# Patient Record
Sex: MALE | Race: WHITE | NOT HISPANIC OR LATINO | Employment: FULL TIME | URBAN - METROPOLITAN AREA
[De-identification: names, ages, dates, MRNs, and addresses within clinical notes are randomized per-mention and may not be internally consistent; named-entity substitution may affect disease eponyms.]

---

## 2017-01-16 ENCOUNTER — GENERIC CONVERSION - ENCOUNTER (OUTPATIENT)
Dept: OTHER | Facility: OTHER | Age: 47
End: 2017-01-16

## 2018-01-15 ENCOUNTER — ALLSCRIPTS OFFICE VISIT (OUTPATIENT)
Dept: OTHER | Facility: OTHER | Age: 48
End: 2018-01-15

## 2018-01-15 ENCOUNTER — GENERIC CONVERSION - ENCOUNTER (OUTPATIENT)
Dept: OTHER | Facility: OTHER | Age: 48
End: 2018-01-15

## 2018-01-15 DIAGNOSIS — R31.29 OTHER MICROSCOPIC HEMATURIA: ICD-10-CM

## 2018-01-15 LAB
CLARITY UR: NORMAL
COLOR UR: YELLOW
GLUCOSE (HISTORICAL): NORMAL
HGB UR QL STRIP.AUTO: NORMAL
KETONES UR STRIP-MCNC: NORMAL MG/DL
LEUKOCYTE ESTERASE UR QL STRIP: NORMAL
NITRITE UR QL STRIP: NORMAL
PH UR STRIP.AUTO: 5 [PH]
PROT UR STRIP-MCNC: NORMAL MG/DL

## 2018-01-23 ENCOUNTER — TRANSCRIBE ORDERS (OUTPATIENT)
Dept: ADMINISTRATIVE | Facility: HOSPITAL | Age: 48
End: 2018-01-23

## 2018-01-23 DIAGNOSIS — R31.29 MICROSCOPIC HEMATURIA: Primary | ICD-10-CM

## 2018-01-23 NOTE — RESULT NOTES
Verified Results  Urine Dip Non-Automated- POC [id number] 09:58AM Miles Punch     Test Name Result Flag Reference   Color Yellow     Clarity Transparent     Leukocytes neg     Nitrite neg     Blood +++     Protein +     Ph 5     Ketone neg     Glucose neg     Color Yellow     Clarity Transparent     Leukocytes neg     Nitrite neg     Blood +++     Protein +     Ph 5     Ketone neg     Glucose neg

## 2019-02-15 ENCOUNTER — OFFICE VISIT (OUTPATIENT)
Dept: UROLOGY | Facility: CLINIC | Age: 49
End: 2019-02-15
Payer: COMMERCIAL

## 2019-02-15 VITALS
HEIGHT: 70 IN | WEIGHT: 179 LBS | BODY MASS INDEX: 25.62 KG/M2 | SYSTOLIC BLOOD PRESSURE: 140 MMHG | DIASTOLIC BLOOD PRESSURE: 80 MMHG | HEART RATE: 80 BPM

## 2019-02-15 DIAGNOSIS — N40.0 BENIGN PROSTATIC HYPERPLASIA WITHOUT LOWER URINARY TRACT SYMPTOMS: Primary | ICD-10-CM

## 2019-02-15 DIAGNOSIS — R31.29 MICROSCOPIC HEMATURIA: ICD-10-CM

## 2019-02-15 LAB
SL AMB  POCT GLUCOSE, UA: ABNORMAL
SL AMB LEUKOCYTE ESTERASE,UA: ABNORMAL
SL AMB POCT BILIRUBIN,UA: ABNORMAL
SL AMB POCT BLOOD,UA: ABNORMAL
SL AMB POCT CLARITY,UA: CLEAR
SL AMB POCT COLOR,UA: YELLOW
SL AMB POCT KETONES,UA: ABNORMAL
SL AMB POCT NITRITE,UA: ABNORMAL
SL AMB POCT PH,UA: 5
SL AMB POCT SPECIFIC GRAVITY,UA: 1.01
SL AMB POCT URINE PROTEIN: ABNORMAL
SL AMB POCT UROBILINOGEN: ABNORMAL

## 2019-02-15 PROCEDURE — 99213 OFFICE O/P EST LOW 20 MIN: CPT | Performed by: PHYSICIAN ASSISTANT

## 2019-02-15 PROCEDURE — 88112 CYTOPATH CELL ENHANCE TECH: CPT | Performed by: PATHOLOGY

## 2019-02-15 PROCEDURE — 81002 URINALYSIS NONAUTO W/O SCOPE: CPT | Performed by: PHYSICIAN ASSISTANT

## 2019-02-15 RX ORDER — DOXAZOSIN MESYLATE 4 MG/1
4 TABLET ORAL
Qty: 90 TABLET | Refills: 3 | Status: SHIPPED | OUTPATIENT
Start: 2019-02-15 | End: 2020-01-21 | Stop reason: SDUPTHER

## 2019-02-15 RX ORDER — DOCUSATE SODIUM 100 MG/1
100 CAPSULE, LIQUID FILLED ORAL DAILY
COMMUNITY
End: 2019-06-04

## 2019-02-15 RX ORDER — DOXAZOSIN 2 MG/1
1 TABLET ORAL DAILY
COMMUNITY
Start: 2018-01-15 | End: 2019-02-15 | Stop reason: SDUPTHER

## 2019-02-15 NOTE — PROGRESS NOTES
UROLOGY PROGRESS NOTE   Patient Identifiers: Vira Lopez (MRN [id number])  Date of Service: 2/15/2019    Subjective:     61-year-old man history of microscopic hematuria  He has a past history of prostatitis he has been asymptomatic for several years  He has been evaluated for microscopic blood greater than 10 years ago in [place]  His urine shows 3+ microscopic blood and he has been resistant for re- evaluation  He refuses imaging or cystoscopy  His last cystoscopy in 2006 was unremarkable  Patient has  no complaints  Objective:     VITALS:    Vitals:    02/15/19 0955   BP: 140/80   Pulse: 80     AUA SYMPTOM SCORE      Most Recent Value   AUA SYMPTOM SCORE   How often have you had a sensation of not emptying your bladder completely after you finished urinating? 0   How often have you had to urinate again less than two hours after you finished urinating? 1   How often have you found you stopped and started again several times when you urinate? 1   How often have you found it difficult to postpone urination? 1   How often have you had a weak urinary stream?  5   How often have you had to push or strain to begin urination? 0   How many times did you most typically get up to urinate from the time you went to bed at night until the time you got up in the morning?   1   Quality of Life: If you were to spend the rest of your life with your urinary condition just the way it is now, how would you feel about that?  4   AUA SYMPTOM SCORE  9            LABS:  No results found for: HGB, HCT, WBC, PLT]    No results found for: NA, K, CL, CO2, BUN, CREATININE, CALCIUM, GLUCOSE]        INPATIENT MEDS:    Current Outpatient Medications:     docusate sodium (COLACE) 100 mg capsule, Take 100 mg by mouth daily, Disp: , Rfl:     Multiple Vitamin (MULTIVITAMINS PO), Take 1 capsule by mouth daily, Disp: , Rfl:     doxazosin (CARDURA) 4 mg tablet, Take 1 tablet (4 mg total) by mouth daily at bedtime, Disp: 90 tablet, Rfl: 3    oxyCODONE (OXY-IR) 5 MG capsule, Take 1 capsule (5 mg total) by mouth every 6 (six) hours as needed for moderate pain  Max Daily Amount: 20 mg (Patient not taking: Reported on 2/15/2019), Disp: 8 capsule, Rfl: 0      Physical Exam:   /80 (BP Location: Right arm, Patient Position: Sitting, Cuff Size: Adult)   Pulse 80   Ht 5' 10" (1 778 m)   Wt 81 2 kg (179 lb)   BMI 25 68 kg/m²   GEN: no acute distress    RESP: breathing comfortably with no accessory muscle use    ABD: soft, non-tender, non-distended   INCISION:    EXT: no significant peripheral edema   (Male): Penis circumcised, phallus normal, meatus patent  Testicles descended into scrotum bilaterally without masses nor tenderness  No inguinal hernias bilaterally  NATHALIE: Prostate is not enlarged at 30 grams  The prostate is not boggy  The prostate is not tender  No nodules noted      RADIOLOGY:      none     Assessment:    1   Microscopic hematuria     Plan:   - recommend imaging and cystoscopy he again declines  - follow-up 1 year for annual exam-  -

## 2019-02-20 ENCOUNTER — TELEPHONE (OUTPATIENT)
Dept: UROLOGY | Facility: AMBULATORY SURGERY CENTER | Age: 49
End: 2019-02-20

## 2019-06-04 ENCOUNTER — OFFICE VISIT (OUTPATIENT)
Dept: FAMILY MEDICINE CLINIC | Facility: CLINIC | Age: 49
End: 2019-06-04
Payer: COMMERCIAL

## 2019-06-04 VITALS
BODY MASS INDEX: 25.17 KG/M2 | DIASTOLIC BLOOD PRESSURE: 90 MMHG | HEART RATE: 63 BPM | RESPIRATION RATE: 16 BRPM | SYSTOLIC BLOOD PRESSURE: 128 MMHG | OXYGEN SATURATION: 97 % | TEMPERATURE: 98.1 F | WEIGHT: 175.8 LBS | HEIGHT: 70 IN

## 2019-06-04 DIAGNOSIS — Z13.6 SCREENING FOR CARDIOVASCULAR CONDITION: ICD-10-CM

## 2019-06-04 DIAGNOSIS — R53.83 FATIGUE, UNSPECIFIED TYPE: ICD-10-CM

## 2019-06-04 DIAGNOSIS — R31.29 HEMATURIA, MICROSCOPIC: ICD-10-CM

## 2019-06-04 DIAGNOSIS — Z00.00 WELL ADULT EXAM: Primary | ICD-10-CM

## 2019-06-04 DIAGNOSIS — N40.0 BPH WITHOUT URINARY OBSTRUCTION: ICD-10-CM

## 2019-06-04 PROCEDURE — 99386 PREV VISIT NEW AGE 40-64: CPT | Performed by: FAMILY MEDICINE

## 2019-06-13 ENCOUNTER — APPOINTMENT (OUTPATIENT)
Dept: LAB | Facility: CLINIC | Age: 49
End: 2019-06-13
Payer: COMMERCIAL

## 2019-06-13 DIAGNOSIS — Z13.6 SCREENING FOR CARDIOVASCULAR CONDITION: ICD-10-CM

## 2019-06-13 DIAGNOSIS — R53.83 FATIGUE, UNSPECIFIED TYPE: ICD-10-CM

## 2019-06-13 LAB
ALBUMIN SERPL BCP-MCNC: 3.9 G/DL (ref 3.5–5)
ALP SERPL-CCNC: 64 U/L (ref 46–116)
ALT SERPL W P-5'-P-CCNC: 19 U/L (ref 12–78)
ANION GAP SERPL CALCULATED.3IONS-SCNC: 4 MMOL/L (ref 4–13)
AST SERPL W P-5'-P-CCNC: 15 U/L (ref 5–45)
BASOPHILS # BLD AUTO: 0.08 THOUSANDS/ΜL (ref 0–0.1)
BASOPHILS NFR BLD AUTO: 2 % (ref 0–1)
BILIRUB SERPL-MCNC: 0.82 MG/DL (ref 0.2–1)
BUN SERPL-MCNC: 13 MG/DL (ref 5–25)
CALCIUM SERPL-MCNC: 8.8 MG/DL (ref 8.3–10.1)
CHLORIDE SERPL-SCNC: 106 MMOL/L (ref 100–108)
CHOLEST SERPL-MCNC: 125 MG/DL (ref 50–200)
CO2 SERPL-SCNC: 32 MMOL/L (ref 21–32)
CREAT SERPL-MCNC: 1.05 MG/DL (ref 0.6–1.3)
EOSINOPHIL # BLD AUTO: 0.2 THOUSAND/ΜL (ref 0–0.61)
EOSINOPHIL NFR BLD AUTO: 4 % (ref 0–6)
ERYTHROCYTE [DISTWIDTH] IN BLOOD BY AUTOMATED COUNT: 12 % (ref 11.6–15.1)
GFR SERPL CREATININE-BSD FRML MDRD: 84 ML/MIN/1.73SQ M
GLUCOSE P FAST SERPL-MCNC: 89 MG/DL (ref 65–99)
HCT VFR BLD AUTO: 46.3 % (ref 36.5–49.3)
HDLC SERPL-MCNC: 34 MG/DL (ref 40–60)
HGB BLD-MCNC: 15.4 G/DL (ref 12–17)
IMM GRANULOCYTES # BLD AUTO: 0.01 THOUSAND/UL (ref 0–0.2)
IMM GRANULOCYTES NFR BLD AUTO: 0 % (ref 0–2)
LDLC SERPL CALC-MCNC: 72 MG/DL (ref 0–100)
LYMPHOCYTES # BLD AUTO: 1.34 THOUSANDS/ΜL (ref 0.6–4.47)
LYMPHOCYTES NFR BLD AUTO: 30 % (ref 14–44)
MCH RBC QN AUTO: 32.6 PG (ref 26.8–34.3)
MCHC RBC AUTO-ENTMCNC: 33.3 G/DL (ref 31.4–37.4)
MCV RBC AUTO: 98 FL (ref 82–98)
MONOCYTES # BLD AUTO: 0.48 THOUSAND/ΜL (ref 0.17–1.22)
MONOCYTES NFR BLD AUTO: 11 % (ref 4–12)
NEUTROPHILS # BLD AUTO: 2.39 THOUSANDS/ΜL (ref 1.85–7.62)
NEUTS SEG NFR BLD AUTO: 53 % (ref 43–75)
NRBC BLD AUTO-RTO: 0 /100 WBCS
PLATELET # BLD AUTO: 160 THOUSANDS/UL (ref 149–390)
PMV BLD AUTO: 8.3 FL (ref 8.9–12.7)
POTASSIUM SERPL-SCNC: 3.6 MMOL/L (ref 3.5–5.3)
PROT SERPL-MCNC: 7.4 G/DL (ref 6.4–8.2)
RBC # BLD AUTO: 4.73 MILLION/UL (ref 3.88–5.62)
SODIUM SERPL-SCNC: 142 MMOL/L (ref 136–145)
TRIGL SERPL-MCNC: 97 MG/DL
TSH SERPL DL<=0.05 MIU/L-ACNC: 2.61 UIU/ML (ref 0.36–3.74)
WBC # BLD AUTO: 4.5 THOUSAND/UL (ref 4.31–10.16)

## 2019-06-13 PROCEDURE — 85025 COMPLETE CBC W/AUTO DIFF WBC: CPT

## 2019-06-13 PROCEDURE — 80061 LIPID PANEL: CPT

## 2019-06-13 PROCEDURE — 84443 ASSAY THYROID STIM HORMONE: CPT

## 2019-06-13 PROCEDURE — 80053 COMPREHEN METABOLIC PANEL: CPT

## 2019-06-13 PROCEDURE — 36415 COLL VENOUS BLD VENIPUNCTURE: CPT

## 2019-09-16 ENCOUNTER — OFFICE VISIT (OUTPATIENT)
Dept: URGENT CARE | Age: 49
End: 2019-09-16
Payer: COMMERCIAL

## 2019-09-16 VITALS
TEMPERATURE: 98 F | DIASTOLIC BLOOD PRESSURE: 86 MMHG | SYSTOLIC BLOOD PRESSURE: 164 MMHG | HEART RATE: 68 BPM | RESPIRATION RATE: 20 BRPM | OXYGEN SATURATION: 99 %

## 2019-09-16 DIAGNOSIS — M54.6 LEFT-SIDED THORACIC BACK PAIN, UNSPECIFIED CHRONICITY: Primary | ICD-10-CM

## 2019-09-16 PROCEDURE — S9083 URGENT CARE CENTER GLOBAL: HCPCS | Performed by: PHYSICIAN ASSISTANT

## 2019-09-16 PROCEDURE — G0382 LEV 3 HOSP TYPE B ED VISIT: HCPCS | Performed by: PHYSICIAN ASSISTANT

## 2019-09-16 RX ORDER — METHOCARBAMOL 500 MG/1
500 TABLET, FILM COATED ORAL 3 TIMES DAILY
Qty: 12 TABLET | Refills: 0 | Status: SHIPPED | OUTPATIENT
Start: 2019-09-16 | End: 2019-09-23

## 2019-09-16 NOTE — PROGRESS NOTES
[facility]        NAME: Sapphire Brink is a 51 y o  male  : 1970    MRN: [id number]  DATE: 2019  TIME: 2:44 PM    Assessment and Plan   Left-sided thoracic back pain, unspecified chronicity [M54 6]  1  Left-sided thoracic back pain, unspecified chronicity  methocarbamol (ROBAXIN) 500 mg tablet         Patient Instructions     Take robaxin as directed  C/w OTC symptom relief, recommend patches, icyhot, & moist heat  Follow up with PCP in 3-5 days  Proceed to  ER if symptoms worsen  Chief Complaint     Chief Complaint   Patient presents with    Neck Pain     x saturday, denies injury , left side of neck between neck and shoulder blade, difficulty moving , with twitching in left arm   History of Present Illness       Patient with hx of chronic back pain presents with complaint of left upper back pain x 2 days  Pt states that he was doing a lot of wood work and states that the pain developed the following day  Pt reports associated twitching in the muscles of his left arm and intermittent paresthesias  Pt reports taking 400 mg of ibuprofen for the past two days  Pt denies other palliative measures  Pt denies fever, chills, night sweats, chest pain, dyspnea, weakness, and radiation of pain  Review of Systems   Review of Systems   Constitutional: Negative for chills, fatigue and fever  HENT: Negative for congestion, ear pain, postnasal drip, rhinorrhea and sore throat  Respiratory: Negative for cough, chest tightness and shortness of breath  Cardiovascular: Negative for chest pain  Gastrointestinal: Negative for abdominal pain, blood in stool, diarrhea, nausea and vomiting  Musculoskeletal: Positive for back pain  Negative for myalgias, neck pain and neck stiffness  Skin: Negative for color change, rash and wound  Neurological: Negative for dizziness, weakness, light-headedness, numbness and headaches     All other systems reviewed and are negative  Current Medications       Current Outpatient Medications:     doxazosin (CARDURA) 4 mg tablet, Take 1 tablet (4 mg total) by mouth daily at bedtime, Disp: 90 tablet, Rfl: 3    methocarbamol (ROBAXIN) 500 mg tablet, Take 1 tablet (500 mg total) by mouth 3 (three) times a day, Disp: 12 tablet, Rfl: 0    Multiple Vitamin (MULTIVITAMINS PO), Take 1 capsule by mouth daily, Disp: , Rfl:     Current Allergies     Allergies as of 09/16/2019    (No Known Allergies)            The following portions of the patient's history were reviewed and updated as appropriate: allergies, current medications, past family history, past medical history, past social history, past surgical history and problem list      Past Medical History:   Diagnosis Date    Hypertension        Past Surgical History:   Procedure Laterality Date    HERNIA REPAIR      TONSILLECTOMY         Family History   Problem Relation Age of Onset    Cancer Mother         Bladder    Hypertension Mother     Skin cancer Father     Arthritis Father     Diabetes Brother     Rheum arthritis Brother     Hypertension Brother          Medications have been verified  Objective   /86   Pulse 68   Temp 98 °F (36 7 °C)   Resp 20   SpO2 99%        Physical Exam     Physical Exam   Constitutional: He is oriented to person, place, and time  He appears well-developed and well-nourished  No distress  HENT:   Head: Normocephalic and atraumatic  Eyes: Pupils are equal, round, and reactive to light  Conjunctivae and EOM are normal    Neck: Normal range of motion  Neck supple  Cardiovascular: Normal rate, regular rhythm and normal heart sounds  Pulmonary/Chest: Effort normal and breath sounds normal  No respiratory distress  Musculoskeletal: He exhibits tenderness     Thoracic spine: no skin changes; tender to deep palpation over left PVMs; sensation intact  Left shoulder: FAROM; UE strength 5/5; sensation intact   Lymphadenopathy: He has no cervical adenopathy  Neurological: He is alert and oriented to person, place, and time  No cranial nerve deficit or sensory deficit  Skin: Skin is warm and dry  Capillary refill takes less than 2 seconds  No rash noted  He is not diaphoretic  Psychiatric: He has a normal mood and affect  His behavior is normal  Thought content normal    Nursing note and vitals reviewed

## 2019-09-23 ENCOUNTER — OFFICE VISIT (OUTPATIENT)
Dept: FAMILY MEDICINE CLINIC | Facility: CLINIC | Age: 49
End: 2019-09-23
Payer: COMMERCIAL

## 2019-09-23 VITALS
HEART RATE: 88 BPM | HEIGHT: 70 IN | RESPIRATION RATE: 18 BRPM | SYSTOLIC BLOOD PRESSURE: 120 MMHG | WEIGHT: 177.8 LBS | OXYGEN SATURATION: 97 % | DIASTOLIC BLOOD PRESSURE: 74 MMHG | BODY MASS INDEX: 25.45 KG/M2 | TEMPERATURE: 97.8 F

## 2019-09-23 DIAGNOSIS — M62.838 CERVICAL PARASPINOUS MUSCLE SPASM: ICD-10-CM

## 2019-09-23 DIAGNOSIS — M54.9 UPPER BACK PAIN ON LEFT SIDE: ICD-10-CM

## 2019-09-23 DIAGNOSIS — M50.90 CERVICAL NECK PAIN WITH EVIDENCE OF DISC DISEASE: Primary | ICD-10-CM

## 2019-09-23 PROCEDURE — 99214 OFFICE O/P EST MOD 30 MIN: CPT | Performed by: FAMILY MEDICINE

## 2019-09-23 RX ORDER — PREDNISONE 10 MG/1
TABLET ORAL
Qty: 20 TABLET | Refills: 0 | Status: SHIPPED | OUTPATIENT
Start: 2019-09-23 | End: 2019-10-01

## 2019-09-23 RX ORDER — GABAPENTIN 300 MG/1
300 CAPSULE ORAL
Qty: 30 CAPSULE | Refills: 1 | Status: SHIPPED | OUTPATIENT
Start: 2019-09-23 | End: 2019-10-04

## 2019-09-23 NOTE — PROGRESS NOTES
FAMILY PRACTICE OFFICE VISIT       NAME: Maria Del Carmen Corado  AGE: 51 y o  SEX: male       : 1970        MRN: [id number]        Assessment and Plan     Problem List Items Addressed This Visit     None      Visit Diagnoses     Cervical neck pain with evidence of disc disease    -  Primary    Relevant Orders    XR spine cervical complete 4 or 5 vw non injury    Cervical paraspinous muscle spasm        Relevant Medications    predniSONE 10 mg tablet    gabapentin (NEURONTIN) 300 mg capsule    Upper back pain on left side        Relevant Orders    XR spine thoracic 3 vw       Patient presents for evaluation of neck pain/upper back pain/thoracic spine pain with left upper extremity radiculopathy  His symptoms have been persistent within past 8-9 days  No improvement with over-the-counter anti-inflammatories as well as muscle relaxant that was prescribed by urgent care center  Will proceed with x-ray of cervical and thoracic spine  Patient will start on prednisone taper at 40 mg daily for 2 days then 30 mg daily for 2 days then 20 mg daily for 2 days then 10 mg daily for 2 days  Will add gabapentin 300 mg q h s     Patient will contact me in a few days if his symptoms are not improving  Will establish follow-up plan of care pending results of x-rays    There are no Patient Instructions on file for this visit  Discussed with the patient and all questioned fully answered  He will call me if any problems arise  M*Modal software was used to dictate this note  It may contain errors with dictating incorrect words/spelling  Please contact provider directly with any questions  Chief Complaint     Chief Complaint   Patient presents with    Shoulder Pain     1 week ago Left shoulder did get a muscle relaxer but it did not help       History of Present Illness     Patient presents for evaluation of neck and upper back pain    Symptoms have started 8-9 days ago after patient has been doing would work/yd work   He woke up next day with upper back pain radiating down to his left shoulder blade, arm and down to his fingers  Patient was evaluated at urgent care center a week ago on Monday  He was prescribed muscle relaxant, medication did not provide any significant relief  Patient has tried anti-inflammatories with minimal improvement  He denies symptoms of chest pain, palpitations, shortness of breath or dizziness  No low back pain, no lower extremity numbness, tingling or paresthesias  Patient admits to decreased strength in his left arm  Pain is significantly worse at night, patient also admits to severe pain upon getting up from the bed  poor sleep within past few days  Pain in the neck is worse with flexion and extension, simple ADLs like brushing his teeth becomes very painful  Shoulder Pain    Pertinent negatives include no fever or numbness  Review of Systems   Review of Systems   Constitutional: Negative  Negative for fever  HENT: Negative  Respiratory: Negative  Cardiovascular: Negative  Gastrointestinal: Negative  Genitourinary: Negative  Musculoskeletal: Positive for myalgias, neck pain and neck stiffness  Neurological: Positive for weakness (Left upper extremity)  Negative for dizziness, numbness and headaches  Psychiatric/Behavioral: Negative          Active Problem List     Patient Active Problem List   Diagnosis    Hematuria, microscopic    BPH without urinary obstruction       Past Medical History:  Past Medical History:   Diagnosis Date    Hypertension        Past Surgical History:  Past Surgical History:   Procedure Laterality Date    HERNIA REPAIR      TONSILLECTOMY         Family History:  Family History   Problem Relation Age of Onset    Cancer Mother         Bladder    Hypertension Mother     Skin cancer Father     Arthritis Father     Diabetes Brother     Rheum arthritis Brother     Hypertension Brother        Social History:  Social History     Socioeconomic History    Marital status: /Civil Union     Spouse name: Not on file    Number of children: Not on file    Years of education: Not on file    Highest education level: Not on file   Occupational History    Not on file   Social Needs    Financial resource strain: Not on file    Food insecurity:     Worry: Not on file     Inability: Not on file    Transportation needs:     Medical: Not on file     Non-medical: Not on file   Tobacco Use    Smoking status: Never Smoker    Smokeless tobacco: Never Used   Substance and Sexual Activity    Alcohol use: Yes     Comment: Social    Drug use: No    Sexual activity: Not on file   Lifestyle    Physical activity:     Days per week: Not on file     Minutes per session: Not on file    Stress: Not on file   Relationships    Social connections:     Talks on phone: Not on file     Gets together: Not on file     Attends Latter day service: Not on file     Active member of club or organization: Not on file     Attends meetings of clubs or organizations: Not on file     Relationship status: Not on file    Intimate partner violence:     Fear of current or ex partner: Not on file     Emotionally abused: Not on file     Physically abused: Not on file     Forced sexual activity: Not on file   Other Topics Concern    Not on file   Social History Narrative    Not on file     PHQ-9 Depression Screening    PHQ-9:    Frequency of the following problems over the past two weeks:                    Objective     Vitals:    09/23/19 1505   BP: 120/74   BP Location: Left arm   Patient Position: Sitting   Cuff Size: Adult   Pulse: 88   Resp: 18   Temp: 97 8 °F (36 6 °C)   TempSrc: Tympanic   SpO2: 97%   Weight: 80 6 kg (177 lb 12 8 oz)   Height: 5' 10" (1 778 m)     Wt Readings from Last 3 Encounters:   09/23/19 80 6 kg (177 lb 12 8 oz)   06/04/19 79 7 kg (175 lb 12 8 oz)   02/15/19 81 2 kg (179 lb)       Physical Exam   Constitutional: He is oriented to person, place, and time  He appears well-developed and well-nourished  HENT:   Head: Normocephalic and atraumatic  Eyes: Conjunctivae are normal    Neck: Neck supple  No JVD present  Carotid bruit is not present  Cardiovascular: Normal rate, regular rhythm and normal heart sounds  No murmur heard  Pulmonary/Chest: Effort normal and breath sounds normal  No respiratory distress  He has no wheezes  He has no rales  Abdominal: Bowel sounds are normal  He exhibits no distension and no abdominal bruit  Musculoskeletal: Normal range of motion  He exhibits no edema  Decreased range of motion of neck, very painful extension and flexion  Painful lateral range of motion,  Cervical paraspinal and trapezius spasm  Thoracic paraspinal spasm  Upper extremity strength 4/5 bilaterally   Neurological: He is alert and oriented to person, place, and time  No cranial nerve deficit  Psychiatric: He has a normal mood and affect  His behavior is normal    Nursing note and vitals reviewed        Pertinent Laboratory/Diagnostic Studies:  Lab Results   Component Value Date    BUN 13 06/13/2019    CREATININE 1 05 06/13/2019    CALCIUM 8 8 06/13/2019    K 3 6 06/13/2019    CO2 32 06/13/2019     06/13/2019     Lab Results   Component Value Date    ALT 19 06/13/2019    AST 15 06/13/2019    ALKPHOS 64 06/13/2019       Lab Results   Component Value Date    WBC 4 50 06/13/2019    HGB 15 4 06/13/2019    HCT 46 3 06/13/2019    MCV 98 06/13/2019     06/13/2019       No results found for: TSH    No results found for: CHOL  Lab Results   Component Value Date    TRIG 97 06/13/2019     Lab Results   Component Value Date    HDL 34 (L) 06/13/2019     Lab Results   Component Value Date    LDLCALC 72 06/13/2019     No results found for: HGBA1C    Results for orders placed or performed in visit on 06/13/19   Comprehensive metabolic panel   Result Value Ref Range    Sodium 142 136 - 145 mmol/L    Potassium 3 6 3 5 - 5 3 mmol/L Chloride 106 100 - 108 mmol/L    CO2 32 21 - 32 mmol/L    ANION GAP 4 4 - 13 mmol/L    BUN 13 5 - 25 mg/dL    Creatinine 1 05 0 60 - 1 30 mg/dL    Glucose, Fasting 89 65 - 99 mg/dL    Calcium 8 8 8 3 - 10 1 mg/dL    AST 15 5 - 45 U/L    ALT 19 12 - 78 U/L    Alkaline Phosphatase 64 46 - 116 U/L    Total Protein 7 4 6 4 - 8 2 g/dL    Albumin 3 9 3 5 - 5 0 g/dL    Total Bilirubin 0 82 0 20 - 1 00 mg/dL    eGFR 84 ml/min/1 73sq m   Lipid Panel with Direct LDL reflex   Result Value Ref Range    Cholesterol 125 50 - 200 mg/dL    Triglycerides 97 <=150 mg/dL    HDL, Direct 34 (L) 40 - 60 mg/dL    LDL Calculated 72 0 - 100 mg/dL   TSH, 3rd generation   Result Value Ref Range    TSH 3RD GENERATON 2 610 0 358 - 3 740 uIU/mL   CBC and differential   Result Value Ref Range    WBC 4 50 4 31 - 10 16 Thousand/uL    RBC 4 73 3 88 - 5 62 Million/uL    Hemoglobin 15 4 12 0 - 17 0 g/dL    Hematocrit 46 3 36 5 - 49 3 %    MCV 98 82 - 98 fL    MCH 32 6 26 8 - 34 3 pg    MCHC 33 3 31 4 - 37 4 g/dL    RDW 12 0 11 6 - 15 1 %    MPV 8 3 (L) 8 9 - 12 7 fL    Platelets 539 874 - 566 Thousands/uL    nRBC 0 /100 WBCs    Neutrophils Relative 53 43 - 75 %    Immat GRANS % 0 0 - 2 %    Lymphocytes Relative 30 14 - 44 %    Monocytes Relative 11 4 - 12 %    Eosinophils Relative 4 0 - 6 %    Basophils Relative 2 (H) 0 - 1 %    Neutrophils Absolute 2 39 1 85 - 7 62 Thousands/µL    Immature Grans Absolute 0 01 0 00 - 0 20 Thousand/uL    Lymphocytes Absolute 1 34 0 60 - 4 47 Thousands/µL    Monocytes Absolute 0 48 0 17 - 1 22 Thousand/µL    Eosinophils Absolute 0 20 0 00 - 0 61 Thousand/µL    Basophils Absolute 0 08 0 00 - 0 10 Thousands/µL       Orders Placed This Encounter   Procedures    XR spine cervical complete 4 or 5 vw non injury    XR spine thoracic 3 vw       ALLERGIES:  No Known Allergies    Current Medications     Current Outpatient Medications   Medication Sig Dispense Refill    doxazosin (CARDURA) 4 mg tablet Take 1 tablet (4 mg total) by mouth daily at bedtime 90 tablet 3    Multiple Vitamin (MULTIVITAMINS PO) Take 1 capsule by mouth daily      gabapentin (NEURONTIN) 300 mg capsule Take 1 capsule (300 mg total) by mouth daily at bedtime 30 capsule 1    predniSONE 10 mg tablet Take 40 mg (4 tabs) daily x 2 days; then 30 mg (3 tabs) daily x 2 days then 20 mg (2 tabs) daily x 2 days then 10 mg ( 1 tab) dailyx 2 days 20 tablet 0     No current facility-administered medications for this visit  Medications Discontinued During This Encounter   Medication Reason    methocarbamol (ROBAXIN) 500 mg tablet        Health Maintenance     Health Maintenance   Topic Date Due    BMI: Followup Plan  09/03/1989    INFLUENZA VACCINE  07/01/2019    DTaP,Tdap,and Td Vaccines (1 - Tdap) 06/03/2020 (Originally 9/3/1992)    Depression Screening PHQ  09/16/2020    BMI: Adult  09/23/2020    Pneumococcal Vaccine: 65+ Years (1 of 2 - PCV13) 09/03/2036    Pneumococcal Vaccine: Pediatrics (0 to 5 Years) and At-Risk Patients (6 to 59 Years)  Aged Out    HEPATITIS B VACCINES  Aged Out         There is no immunization history on file for this patient      Jerald Valdes MD

## 2019-09-24 ENCOUNTER — APPOINTMENT (OUTPATIENT)
Dept: RADIOLOGY | Age: 49
End: 2019-09-24
Payer: COMMERCIAL

## 2019-09-24 DIAGNOSIS — M54.9 UPPER BACK PAIN ON LEFT SIDE: ICD-10-CM

## 2019-09-24 DIAGNOSIS — M54.2 NECK PAIN: ICD-10-CM

## 2019-09-24 PROCEDURE — 72072 X-RAY EXAM THORAC SPINE 3VWS: CPT

## 2019-09-24 PROCEDURE — 72050 X-RAY EXAM NECK SPINE 4/5VWS: CPT

## 2019-09-30 ENCOUNTER — TELEPHONE (OUTPATIENT)
Dept: FAMILY MEDICINE CLINIC | Facility: CLINIC | Age: 49
End: 2019-09-30

## 2019-09-30 NOTE — TELEPHONE ENCOUNTER
Spoke with patient and gave him results  He is still having the same pain  He stated that the prednisone and the gabapentin dont seem to be working at all  Please call to advise on what the next step is

## 2019-09-30 NOTE — TELEPHONE ENCOUNTER
Please contact patient  I received results of x-rays  Neck x-ray was normal   X-ray of thoracic spine revealed mild arthritic changes  If upper back pain is not improving or persisting-please let me know    Thank you

## 2019-09-30 NOTE — TELEPHONE ENCOUNTER
Patient has tried anti-inflammatories, muscle relaxants, prednisone and gabapentin  If symptoms are not improving,I would like to re-examine him prior to making further suggestions  I could see him tomorrow at 5:15 pm , okay to make my 5 o'clock  appointment 15 minute office visit    Thank you

## 2019-10-01 ENCOUNTER — OFFICE VISIT (OUTPATIENT)
Dept: FAMILY MEDICINE CLINIC | Facility: CLINIC | Age: 49
End: 2019-10-01
Payer: COMMERCIAL

## 2019-10-01 VITALS
OXYGEN SATURATION: 98 % | RESPIRATION RATE: 16 BRPM | BODY MASS INDEX: 25.28 KG/M2 | WEIGHT: 176.6 LBS | TEMPERATURE: 97.6 F | DIASTOLIC BLOOD PRESSURE: 90 MMHG | HEIGHT: 70 IN | SYSTOLIC BLOOD PRESSURE: 130 MMHG | HEART RATE: 74 BPM

## 2019-10-01 DIAGNOSIS — M54.9 UPPER BACK PAIN ON LEFT SIDE: ICD-10-CM

## 2019-10-01 DIAGNOSIS — M79.622 LEFT UPPER ARM PAIN: ICD-10-CM

## 2019-10-01 DIAGNOSIS — M54.9 MID BACK PAIN ON LEFT SIDE: Primary | ICD-10-CM

## 2019-10-01 PROCEDURE — 3008F BODY MASS INDEX DOCD: CPT | Performed by: FAMILY MEDICINE

## 2019-10-01 PROCEDURE — 99213 OFFICE O/P EST LOW 20 MIN: CPT | Performed by: FAMILY MEDICINE

## 2019-10-01 RX ORDER — CELECOXIB 200 MG/1
200 CAPSULE ORAL 2 TIMES DAILY
Qty: 30 CAPSULE | Refills: 1 | Status: SHIPPED | OUTPATIENT
Start: 2019-10-01 | End: 2019-10-09 | Stop reason: SDUPTHER

## 2019-10-01 RX ORDER — CYCLOBENZAPRINE HCL 10 MG
10 TABLET ORAL 3 TIMES DAILY PRN
Qty: 30 TABLET | Refills: 1 | Status: SHIPPED | OUTPATIENT
Start: 2019-10-01 | End: 2021-06-24

## 2019-10-01 NOTE — PROGRESS NOTES
FAMILY PRACTICE OFFICE VISIT       NAME: Onesimo Corado  AGE: 51 y o  SEX: male       : 1970        MRN: [id number]        Assessment and Plan     Problem List Items Addressed This Visit        Other    Left upper arm pain    Relevant Orders    EMG 2 Limb Upper Extremity (Completed)      Other Visit Diagnoses     Mid back pain on left side    -  Primary    Relevant Medications    celecoxib (CeleBREX) 200 mg capsule    cyclobenzaprine (FLEXERIL) 10 mg tablet    Other Relevant Orders    XR chest pa & lateral    XR acromioclavicular joints bilateral w wo weights    Upper back pain on left side        Relevant Orders    XR chest pa & lateral    XR shoulder 2+ vw left    XR acromioclavicular joints bilateral w wo weights       Patient presents for evaluation of persistent upper back/left shoulder blade pain along with left upper extremity achiness and paresthesias  Previous workup included fairly unremarkable cervical and thoracic spine x-rays  Patient unfortunately did not respond to treatment regimen of  prednisone taper and gabapentin  His symptoms are relieved by stretching  No chest pain, palpitations, dyspnea, no exertional symptoms  ·  I suspect significant component of muscle spasm  · Will discontinue gabapentin  · Will start patient on regimen of Celebrex in the morning and Flexeril at night  · He will proceed with additional imaging studies including chest x-ray, left shoulder x-ray and x-ray of AC joint  · Will request EMG/nerve conduction study of upper extremity due to persistent symptoms of upper extremity radiculopathy  · Patient remains under chiropractic care      There are no Patient Instructions on file for this visit  Discussed with the patient and all questioned fully answered  He will call me if any problems arise  M*Modal software was used to dictate this note  It may contain errors with dictating incorrect words/spelling   Please contact provider directly with any questions  Chief Complaint     Chief Complaint   Patient presents with    Back Pain     Pt is here for left sided upper back pain down left arm 2 + wks       History of Present Illness       Persistent upper back, left shoulder blade pain  Patient is here today accompanied by his wife  He was seen in the office a week ago for similar symptoms  We have treated him with 8 day prednisone taper and started gabapentin, patient unfortunately has noticed no improvement in his symptoms  X-ray of cervical spine was unremarkable, x-ray of thoracic spine revealed mild arthritic changes  Persistent pain down to left  arm - entire arm feels achy with constant numbness of 2nd digit     Entire left hand feels "numbish"    Pain is worse with brushing teeth  Pain in left arm with steering wheel while driving, some subjective weakness in the left arm with exercising or pushups  Symptoms are nonexertional, patient denies symptoms of chest pain, palpitations, shortness of breath or dizziness  No diaphoresis  Patient feels worse upon waking up in the morning and getting up from bed        Back Pain   Associated symptoms include numbness  Pertinent negatives include no chest pain or fever  Review of Systems   Review of Systems   Constitutional: Negative  Negative for activity change, appetite change and fever  HENT: Negative  Eyes: Negative  Respiratory: Negative  Negative for cough, chest tightness and shortness of breath  Cardiovascular: Negative  Negative for chest pain, palpitations and leg swelling  Gastrointestinal: Negative  Genitourinary: Negative  Musculoskeletal: Positive for back pain  Neurological: Positive for numbness  Psychiatric/Behavioral: Negative          Active Problem List     Patient Active Problem List   Diagnosis    Hematuria, microscopic    BPH without urinary obstruction    Left upper arm pain    Carpal tunnel syndrome, bilateral       Past Medical History:  Past Medical History:   Diagnosis Date    Hypertension        Past Surgical History:  Past Surgical History:   Procedure Laterality Date    HERNIA REPAIR      TONSILLECTOMY         Family History:  Family History   Problem Relation Age of Onset    Cancer Mother         Bladder    Hypertension Mother     Skin cancer Father     Arthritis Father     Diabetes Brother     Rheum arthritis Brother     Hypertension Brother        Social History:  Social History     Socioeconomic History    Marital status: /Civil Union     Spouse name: Not on file    Number of children: Not on file    Years of education: Not on file    Highest education level: Not on file   Occupational History    Not on file   Social Needs    Financial resource strain: Not on file    Food insecurity:     Worry: Not on file     Inability: Not on file    Transportation needs:     Medical: Not on file     Non-medical: Not on file   Tobacco Use    Smoking status: Never Smoker    Smokeless tobacco: Never Used   Substance and Sexual Activity    Alcohol use: Yes     Comment: Social    Drug use: No    Sexual activity: Not on file   Lifestyle    Physical activity:     Days per week: Not on file     Minutes per session: Not on file    Stress: Not on file   Relationships    Social connections:     Talks on phone: Not on file     Gets together: Not on file     Attends [employer] service: Not on file     Active member of club or organization: Not on file     Attends meetings of clubs or organizations: Not on file     Relationship status: Not on file    Intimate partner violence:     Fear of current or ex partner: Not on file     Emotionally abused: Not on file     Physically abused: Not on file     Forced sexual activity: Not on file   Other Topics Concern    Not on file   Social History Narrative    Not on file       Objective     Vitals:    10/01/19 1718   BP: 130/90   Pulse: 74   Resp: 16   Temp: 97 6 °F (36 4 °C) TempSrc: Tympanic   SpO2: 98%   Weight: 80 1 kg (176 lb 9 6 oz)   Height: 5' 10" (1 778 m)     Wt Readings from Last 3 Encounters:   10/01/19 80 1 kg (176 lb 9 6 oz)   09/23/19 80 6 kg (177 lb 12 8 oz)   06/04/19 79 7 kg (175 lb 12 8 oz)       Physical Exam   Constitutional: He is oriented to person, place, and time  He appears well-developed and well-nourished  HENT:   Head: Normocephalic and atraumatic  Eyes: Conjunctivae are normal    Neck: Neck supple  No JVD present  Carotid bruit is not present  Cardiovascular: Normal rate, regular rhythm and normal heart sounds  No murmur heard  Pulmonary/Chest: Effort normal and breath sounds normal  No respiratory distress  He has no wheezes  He has no rales  Abdominal: He exhibits no distension and no abdominal bruit  Musculoskeletal: Normal range of motion  He exhibits no edema  Left  Shoulder : full ROM-  No  Restriction, no pain  Tenderness with palpation of lower cervical and upper thoracic spine from C6 to T4-T5  Paraspinal spasm, left trapezius spasm  Upper extremity strength 4 /5 all groups, even bilaterally     Neurological: He is alert and oriented to person, place, and time  No cranial nerve deficit  Psychiatric: He has a normal mood and affect  His behavior is normal    Nursing note and vitals reviewed        Pertinent Laboratory/Diagnostic Studies:  Lab Results   Component Value Date    BUN 13 06/13/2019    CREATININE 1 05 06/13/2019    CALCIUM 8 8 06/13/2019    K 3 6 06/13/2019    CO2 32 06/13/2019     06/13/2019     Lab Results   Component Value Date    ALT 19 06/13/2019    AST 15 06/13/2019    ALKPHOS 64 06/13/2019       Lab Results   Component Value Date    WBC 4 50 06/13/2019    HGB 15 4 06/13/2019    HCT 46 3 06/13/2019    MCV 98 06/13/2019     06/13/2019       No results found for: TSH    No results found for: CHOL  Lab Results   Component Value Date    TRIG 97 06/13/2019     Lab Results   Component Value Date    HDL 34 (L) 06/13/2019     Lab Results   Component Value Date    LDLCALC 72 06/13/2019     No results found for: HGBA1C    Results for orders placed or performed in visit on 06/13/19   Comprehensive metabolic panel   Result Value Ref Range    Sodium 142 136 - 145 mmol/L    Potassium 3 6 3 5 - 5 3 mmol/L    Chloride 106 100 - 108 mmol/L    CO2 32 21 - 32 mmol/L    ANION GAP 4 4 - 13 mmol/L    BUN 13 5 - 25 mg/dL    Creatinine 1 05 0 60 - 1 30 mg/dL    Glucose, Fasting 89 65 - 99 mg/dL    Calcium 8 8 8 3 - 10 1 mg/dL    AST 15 5 - 45 U/L    ALT 19 12 - 78 U/L    Alkaline Phosphatase 64 46 - 116 U/L    Total Protein 7 4 6 4 - 8 2 g/dL    Albumin 3 9 3 5 - 5 0 g/dL    Total Bilirubin 0 82 0 20 - 1 00 mg/dL    eGFR 84 ml/min/1 73sq m   Lipid Panel with Direct LDL reflex   Result Value Ref Range    Cholesterol 125 50 - 200 mg/dL    Triglycerides 97 <=150 mg/dL    HDL, Direct 34 (L) 40 - 60 mg/dL    LDL Calculated 72 0 - 100 mg/dL   TSH, 3rd generation   Result Value Ref Range    TSH 3RD GENERATON 2 610 0 358 - 3 740 uIU/mL   CBC and differential   Result Value Ref Range    WBC 4 50 4 31 - 10 16 Thousand/uL    RBC 4 73 3 88 - 5 62 Million/uL    Hemoglobin 15 4 12 0 - 17 0 g/dL    Hematocrit 46 3 36 5 - 49 3 %    MCV 98 82 - 98 fL    MCH 32 6 26 8 - 34 3 pg    MCHC 33 3 31 4 - 37 4 g/dL    RDW 12 0 11 6 - 15 1 %    MPV 8 3 (L) 8 9 - 12 7 fL    Platelets 010 869 - 247 Thousands/uL    nRBC 0 /100 WBCs    Neutrophils Relative 53 43 - 75 %    Immat GRANS % 0 0 - 2 %    Lymphocytes Relative 30 14 - 44 %    Monocytes Relative 11 4 - 12 %    Eosinophils Relative 4 0 - 6 %    Basophils Relative 2 (H) 0 - 1 %    Neutrophils Absolute 2 39 1 85 - 7 62 Thousands/µL    Immature Grans Absolute 0 01 0 00 - 0 20 Thousand/uL    Lymphocytes Absolute 1 34 0 60 - 4 47 Thousands/µL    Monocytes Absolute 0 48 0 17 - 1 22 Thousand/µL    Eosinophils Absolute 0 20 0 00 - 0 61 Thousand/µL    Basophils Absolute 0 08 0 00 - 0 10 Thousands/µL       Orders Placed This Encounter   Procedures    XR chest pa & lateral    XR shoulder 2+ vw left    XR acromioclavicular joints bilateral w wo weights    EMG 2 Limb Upper Extremity       ALLERGIES:  No Known Allergies    Current Medications     Current Outpatient Medications   Medication Sig Dispense Refill    doxazosin (CARDURA) 4 mg tablet Take 1 tablet (4 mg total) by mouth daily at bedtime 90 tablet 3    Multiple Vitamin (MULTIVITAMINS PO) Take 1 capsule by mouth daily      celecoxib (CeleBREX) 200 mg capsule Take 1 capsule (200 mg total) by mouth 2 (two) times a day 30 capsule 1    cyclobenzaprine (FLEXERIL) 10 mg tablet Take 1 tablet (10 mg total) by mouth 3 (three) times a day as needed for muscle spasms 30 tablet 1     No current facility-administered medications for this visit  Medications Discontinued During This Encounter   Medication Reason    predniSONE 10 mg tablet     gabapentin (NEURONTIN) 300 mg capsule        Health Maintenance     Health Maintenance   Topic Date Due    BMI: Followup Plan  09/03/1989    DTaP,Tdap,and Td Vaccines (1 - Tdap) 06/03/2020 (Originally 9/3/1992)    INFLUENZA VACCINE  10/01/2020 (Originally 7/1/2019)    Depression Screening PHQ  09/16/2020    BMI: Adult  10/01/2020    Pneumococcal Vaccine: 65+ Years (1 of 2 - PCV13) 09/03/2036    Pneumococcal Vaccine: Pediatrics (0 to 5 Years) and At-Risk Patients (6 to 59 Years)  Aged Out    HEPATITIS B VACCINES  Aged Out         There is no immunization history on file for this patient      Gaudencio Ureña MD

## 2019-10-03 ENCOUNTER — HOSPITAL ENCOUNTER (OUTPATIENT)
Dept: NEUROLOGY | Facility: CLINIC | Age: 49
Discharge: HOME | End: 2019-10-03
Payer: COMMERCIAL

## 2019-10-03 ENCOUNTER — TRANSCRIBE ORDERS (OUTPATIENT)
Dept: RADIOLOGY | Facility: HOSPITAL | Age: 49
End: 2019-10-03

## 2019-10-03 ENCOUNTER — HOSPITAL ENCOUNTER (OUTPATIENT)
Dept: RADIOLOGY | Facility: HOSPITAL | Age: 49
Discharge: HOME | End: 2019-10-03
Payer: COMMERCIAL

## 2019-10-03 DIAGNOSIS — M54.9 MID BACK PAIN ON LEFT SIDE: ICD-10-CM

## 2019-10-03 DIAGNOSIS — M79.622 LEFT UPPER ARM PAIN: ICD-10-CM

## 2019-10-03 DIAGNOSIS — M54.9 UPPER BACK PAIN ON LEFT SIDE: ICD-10-CM

## 2019-10-03 PROCEDURE — 95886 MUSC TEST DONE W/N TEST COMP: CPT | Performed by: PSYCHIATRY & NEUROLOGY

## 2019-10-03 PROCEDURE — 71046 X-RAY EXAM CHEST 2 VIEWS: CPT

## 2019-10-03 PROCEDURE — 73050 X-RAY EXAM OF SHOULDERS: CPT

## 2019-10-03 PROCEDURE — 95911 NRV CNDJ TEST 9-10 STUDIES: CPT | Performed by: PSYCHIATRY & NEUROLOGY

## 2019-10-03 PROCEDURE — 73030 X-RAY EXAM OF SHOULDER: CPT

## 2019-10-07 ENCOUNTER — TELEPHONE (OUTPATIENT)
Dept: FAMILY MEDICINE CLINIC | Facility: CLINIC | Age: 49
End: 2019-10-07

## 2019-10-07 DIAGNOSIS — G54.0 THORACIC OUTLET SYNDROME: Primary | ICD-10-CM

## 2019-10-07 DIAGNOSIS — M79.622 LEFT UPPER ARM PAIN: ICD-10-CM

## 2019-10-07 NOTE — TELEPHONE ENCOUNTER
I reviewed patient's diagnostic results  Normal chest x-ray  Normal x-ray of left shoulder  Normal x-ray of AC joint  EMG/nerve conduction study of upper extremities:  Evidence of bilateral mild carpal tunnel syndrome, no evidence of cervical radiculopathy  Neurologist states that this study could be insensitive in assessing cervical radiculopathy due to recent onset of symptoms and recommends repeating it in 3-4 months if clinically indicated  I suspect that patient might be experiencing symptoms of thoracic outlet syndrome  Patient states that his symptoms overall have stabilized, possibly slightly better  He is feeling worse at nighttime and 1st thing in the morning  Stiffness in his left shoulder blade and left arm pain worsens at night and in the morning  Patient remains on regimen of Celebrex 200 mg b i d  And Flexeril 10 mg q h s   I advised him to modify this regimen and change Celebrex to 200 mg daily and use 5 mg of Flexeril twice a day and 10 mg at night  Patient remains under care of chiropractor  Will forward records as requested  I am referring patient to Physical Medicine and Rehabilitation for further evaluation  Patient understands instructions and agrees

## 2019-10-09 ENCOUNTER — TELEPHONE (OUTPATIENT)
Dept: FAMILY MEDICINE CLINIC | Facility: CLINIC | Age: 49
End: 2019-10-09

## 2019-10-09 DIAGNOSIS — M54.9 MID BACK PAIN ON LEFT SIDE: ICD-10-CM

## 2019-10-09 RX ORDER — CELECOXIB 200 MG/1
CAPSULE ORAL
Qty: 60 CAPSULE | Refills: 1 | Status: SHIPPED | OUTPATIENT
Start: 2019-10-09 | End: 2021-06-24

## 2019-10-09 NOTE — TELEPHONE ENCOUNTER
Spoke with pt, I also gave him the phone number for Dr Keke Hall  He did not take the antiinflammatory last night as you had said to drop to once a day and had a lot of pain  Took med this am with effect

## 2019-10-09 NOTE — TELEPHONE ENCOUNTER
Please contact patient  He can go back on Celebrex twice a day and continue Flexeril/muscle relaxant at half a tablet twice a day and 1 tablet at night  I will send updated prescription for Celebrex to the pharmacy so he would not run out  Please let him know that I will try to reach Dr Philip and facilitate earlier appointment for him

## 2019-10-09 NOTE — TELEPHONE ENCOUNTER
Wants Dr Bozena Cooper to know that [facility]  & he feels that is too long of a wait  Also states she told him to take the medication once a day & today he is in more pain & it's really bad  Wants to know if it's ok to go back to twice a day again  Please call to advise

## 2019-10-17 ENCOUNTER — TELEPHONE (OUTPATIENT)
Dept: FAMILY MEDICINE CLINIC | Facility: CLINIC | Age: 49
End: 2019-10-17

## 2019-10-17 ENCOUNTER — TELEPHONE (OUTPATIENT)
Dept: NEUROLOGY | Facility: CLINIC | Age: 49
End: 2019-10-17

## 2019-10-17 DIAGNOSIS — M54.9 UPPER BACK PAIN ON LEFT SIDE: ICD-10-CM

## 2019-10-17 DIAGNOSIS — G54.0 THORACIC OUTLET SYNDROME: Primary | ICD-10-CM

## 2019-10-17 NOTE — TELEPHONE ENCOUNTER
I was in touch with [facility], Dr Gil with request to move patient's appointment to earlier date  I was promised that they will try to facilitate earlier appointment but according to the schedule, patient is to see Dr Haile only on 12/4/19  He was also referred to is [place] [facility],Dr Bach  Nurses:  Please call pt Re : above  Please  ask patient to try office of [facility] instead  Hopefully that can accommodate him within next few weeks  I did plce new [facility] referral  Please ask patient to call us back if he is not able to schedule appointment in a timely manner within next few weeks  Please let patient know that I did place orders for MRI of cervical and thoracic spine      Thank you

## 2019-10-17 NOTE — TELEPHONE ENCOUNTER
Patient states Dr Derek Hutchinson was to contact the Dr she referred him to to see if he could get an earlier appt  He is just following up & also states it's around time for him to get an MRI again, wants to know if he can get that also  Please call to advise

## 2019-10-22 ENCOUNTER — TELEPHONE (OUTPATIENT)
Dept: NEUROLOGY | Facility: CLINIC | Age: 49
End: 2019-10-22

## 2019-10-22 NOTE — TELEPHONE ENCOUNTER
Called and left message stating Dr Aline Multani has some sooner appointments, if interested in moving appointment up to please call back

## 2019-10-23 ENCOUNTER — CONSULT (OUTPATIENT)
Dept: NEUROLOGY | Facility: CLINIC | Age: 49
End: 2019-10-23
Payer: COMMERCIAL

## 2019-10-23 VITALS
HEIGHT: 70 IN | WEIGHT: 180 LBS | HEART RATE: 87 BPM | BODY MASS INDEX: 25.77 KG/M2 | SYSTOLIC BLOOD PRESSURE: 144 MMHG | DIASTOLIC BLOOD PRESSURE: 91 MMHG

## 2019-10-23 DIAGNOSIS — G54.0 THORACIC OUTLET SYNDROME: ICD-10-CM

## 2019-10-23 DIAGNOSIS — M54.12 CERVICAL RADICULOPATHY: Primary | ICD-10-CM

## 2019-10-23 DIAGNOSIS — M79.622 LEFT UPPER ARM PAIN: ICD-10-CM

## 2019-10-23 DIAGNOSIS — G56.03 CARPAL TUNNEL SYNDROME, BILATERAL: ICD-10-CM

## 2019-10-23 PROCEDURE — 99244 OFF/OP CNSLTJ NEW/EST MOD 40: CPT | Performed by: PHYSICAL MEDICINE & REHABILITATION
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
## 2019-10-25 ENCOUNTER — TELEPHONE (OUTPATIENT)
Dept: FAMILY MEDICINE CLINIC | Facility: CLINIC | Age: 49
End: 2019-10-25

## 2019-10-29 ENCOUNTER — EVALUATION (OUTPATIENT)
Dept: PHYSICAL THERAPY | Facility: REHABILITATION | Age: 49
End: 2019-10-29
Payer: COMMERCIAL

## 2019-10-29 DIAGNOSIS — M54.12 CERVICAL RADICULOPATHY: Primary | ICD-10-CM

## 2019-10-29 PROCEDURE — 97162 PT EVAL MOD COMPLEX 30 MIN: CPT | Performed by: PHYSICAL THERAPIST

## 2019-10-29 PROCEDURE — 97112 NEUROMUSCULAR REEDUCATION: CPT | Performed by: PHYSICAL THERAPIST
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
## 2019-11-06 ENCOUNTER — OFFICE VISIT (OUTPATIENT)
Dept: PHYSICAL THERAPY | Facility: REHABILITATION | Age: 49
End: 2019-11-06
Payer: COMMERCIAL

## 2019-11-06 DIAGNOSIS — M54.12 CERVICAL RADICULOPATHY: Primary | ICD-10-CM

## 2019-11-06 PROCEDURE — 97110 THERAPEUTIC EXERCISES: CPT

## 2019-11-06 PROCEDURE — 97112 NEUROMUSCULAR REEDUCATION: CPT

## 2019-11-06 PROCEDURE — 97140 MANUAL THERAPY 1/> REGIONS: CPT

## 2019-11-06 NOTE — PROGRESS NOTES
Daily Note     Today's date: 2019  Patient name: Lelia Ruvalcaba  : 1970  MRN: [id number]  Referring provider: Tyler Gutierrez MD  Dx:   Encounter Diagnosis     ICD-10-CM    1  Cervical radiculopathy M54 12        Start Time:   Stop Time:   Total time in clinic (min): 45 minutes    Subjective: Pt reports continued numbness and "pins and needles" that radiate down LUE intermittently  Denies any radicular symptoms prior to start of session  Notices continued weakness in L triceps when lifting objects overhead  Objective: See treatment diary below      Assessment: Tolerated treatment well  Was able to perform all exercise with no significant increase in symptoms  Slight tingling in R index finger reported during radial nerve glides that resolve with rest after completing this interventions  Challenged with [place] movements, but able to complete with no pain or radicular symptoms  Patient would benefit from continued PT to further improve strength and reduce frequency of symptoms  Plan: Continue per plan of care  Monitor response to initial treatment NV  Precautions: HTN      Manual  10/29 11/6           Cervical side glides  KK           Gr  II-IV cervical central PA mobs  KK           Left UT TPR  AFB                                         Exercise Diary  10/29 11/6           UBE  2'/2'           Radial, median nerve glides 2x20 ea  2x20  ea           UT str    20"x4           Wall slides with scapular depression             Shoulder extension             Supine DNF  5"x20           Blackburns: ext, h abd, scap  5"x20 ea                                                                                                                                                                                        Modalities  10/29            Saunder's mechanical traction

## 2019-11-13 ENCOUNTER — OFFICE VISIT (OUTPATIENT)
Dept: PHYSICAL THERAPY | Facility: REHABILITATION | Age: 49
End: 2019-11-13
Payer: COMMERCIAL

## 2019-11-13 DIAGNOSIS — M54.12 CERVICAL RADICULOPATHY: Primary | ICD-10-CM

## 2019-11-13 PROCEDURE — 97110 THERAPEUTIC EXERCISES: CPT | Performed by: PHYSICAL THERAPIST

## 2019-11-13 PROCEDURE — 97012 MECHANICAL TRACTION THERAPY: CPT | Performed by: PHYSICAL THERAPIST

## 2019-11-13 PROCEDURE — 97140 MANUAL THERAPY 1/> REGIONS: CPT | Performed by: PHYSICAL THERAPIST

## 2019-11-13 PROCEDURE — 97112 NEUROMUSCULAR REEDUCATION: CPT | Performed by: PHYSICAL THERAPIST

## 2019-11-13 NOTE — PROGRESS NOTES
Daily Note     Today's date: 2019  Patient name: J Luis Lim  : 1970  MRN: [id number]  Referring provider: Luma Calderón MD  Dx:   Encounter Diagnosis     ICD-10-CM    1  Cervical radiculopathy M54 12        Start Time:   Stop Time:   Total time in clinic (min): 70 minutes    Subjective: Patient reports that much of his pain has resolved and his primary limitation at this time is weakness, which he mostly notices while working out  Patient has a mild sensation of swelling or numbness over his index finger  Objective: See treatment diary below      Assessment: Tolerated treatment well  Patient demonstrated fatigue post treatment, exhibited good technique with therapeutic exercises and would benefit from continued PT  Patient continues to present with mild radicular symptoms, including weakness and numbness of left index finger  No exacerbation of symptoms this visit  Plan: Continue per plan of care  Progress treatment as tolerated  Precautions: HTN      Manual  10/29 11/6 11/13          Cervical side glides  KK GR          Gr  II-IV cervical central PA mobs  KK GR          Left UT TPR  AFB GR                                        Exercise Diary  10/29 11/6 11/13          UBE  2'/2' 2' / 2'          Radial, median nerve glides 2x20 ea  2x20  ea           UT str    20"x4           Wall slides with scapular depression   20x          Shoulder extension with walkout maintaining upper cervical retraction   20x5" XS Purple          Supine DNF  5"x20           Blackburns: ext, h abd, scap  5"x20 ea Reviewed          Horizontal abduction with push-out   2x10 3" GTB          No money with upper cervical retraction   2x10 5"                                                                                                                                                             Modalities  10/29 11/6 11/13          Saunder's mechanical traction   2x5'

## 2019-11-20 ENCOUNTER — APPOINTMENT (OUTPATIENT)
Dept: PHYSICAL THERAPY | Facility: REHABILITATION | Age: 49
End: 2019-11-20
Payer: COMMERCIAL

## 2019-11-25 NOTE — PROGRESS NOTES
Robert Whiteheadian has attended a total of 3 physical therapy appointments to date  Patient was last treated on 11/13/19 and has cancelled all remaining appointments scheduled  Goals, objective and subjective information unable to be updated at this time   Patient will be discharged from physical therapy per request

## 2019-11-27 ENCOUNTER — APPOINTMENT (OUTPATIENT)
Dept: PHYSICAL THERAPY | Facility: REHABILITATION | Age: 49
End: 2019-11-27
Payer: COMMERCIAL

## 2020-01-06 ENCOUNTER — TELEPHONE (OUTPATIENT)
Dept: NEUROLOGY | Facility: CLINIC | Age: 50
End: 2020-01-06

## 2020-01-06 NOTE — TELEPHONE ENCOUNTER
Called and [facility] letting patient know we are calling to reschedule his appointment  We have availability 3/25/2020 at 2:00 pm if patient would like to come in, Let patinet know to call us back to confirm       (30 min F/U w/ Dr Rufina Martinez)

## 2020-01-06 NOTE — TELEPHONE ENCOUNTER
----- Message from Jerald Wade MD sent at 10/9/2019  2:09 PM EDT -----  Regarding: new patient pacheco Messina Edenikolas Egan,    Can you call this patient and see if Dr Khari Lo has an earlier 60 min time slot to be seen for arm pain/numbness/thoracic outlet syndrome type symptoms        Let me know when the appointment is made

## 2020-01-21 DIAGNOSIS — N40.0 BENIGN PROSTATIC HYPERPLASIA WITHOUT LOWER URINARY TRACT SYMPTOMS: ICD-10-CM

## 2020-01-21 RX ORDER — DOXAZOSIN MESYLATE 4 MG/1
4 TABLET ORAL
Qty: 90 TABLET | Refills: 3 | Status: SHIPPED | OUTPATIENT
Start: 2020-01-21 | End: 2020-11-20 | Stop reason: SDUPTHER

## 2020-06-23 ENCOUNTER — OFFICE VISIT (OUTPATIENT)
Dept: FAMILY MEDICINE CLINIC | Facility: CLINIC | Age: 50
End: 2020-06-23
Payer: COMMERCIAL

## 2020-06-23 VITALS
WEIGHT: 179.8 LBS | HEART RATE: 74 BPM | HEIGHT: 70 IN | BODY MASS INDEX: 25.74 KG/M2 | TEMPERATURE: 98 F | OXYGEN SATURATION: 98 % | RESPIRATION RATE: 16 BRPM | SYSTOLIC BLOOD PRESSURE: 140 MMHG | DIASTOLIC BLOOD PRESSURE: 82 MMHG

## 2020-06-23 DIAGNOSIS — M79.644 PAIN IN FINGER OF RIGHT HAND: ICD-10-CM

## 2020-06-23 DIAGNOSIS — K21.9 GASTROESOPHAGEAL REFLUX DISEASE WITHOUT ESOPHAGITIS: ICD-10-CM

## 2020-06-23 DIAGNOSIS — R12 HEARTBURN: ICD-10-CM

## 2020-06-23 DIAGNOSIS — N40.0 BPH WITHOUT URINARY OBSTRUCTION: ICD-10-CM

## 2020-06-23 DIAGNOSIS — M25.441 SWELLING OF FINGER JOINT OF RIGHT HAND: ICD-10-CM

## 2020-06-23 DIAGNOSIS — Z00.00 WELL ADULT EXAM: Primary | ICD-10-CM

## 2020-06-23 DIAGNOSIS — Z13.220 NEED FOR LIPID SCREENING: ICD-10-CM

## 2020-06-23 PROCEDURE — 99396 PREV VISIT EST AGE 40-64: CPT | Performed by: FAMILY MEDICINE

## 2020-06-23 PROCEDURE — 93000 ELECTROCARDIOGRAM COMPLETE: CPT | Performed by: FAMILY MEDICINE

## 2020-06-23 PROCEDURE — 3008F BODY MASS INDEX DOCD: CPT | Performed by: FAMILY MEDICINE

## 2020-06-24 ENCOUNTER — APPOINTMENT (OUTPATIENT)
Dept: LAB | Facility: CLINIC | Age: 50
End: 2020-06-24
Payer: COMMERCIAL

## 2020-06-24 DIAGNOSIS — Z13.220 NEED FOR LIPID SCREENING: ICD-10-CM

## 2020-06-24 DIAGNOSIS — K21.9 GASTROESOPHAGEAL REFLUX DISEASE WITHOUT ESOPHAGITIS: ICD-10-CM

## 2020-06-24 DIAGNOSIS — M79.644 PAIN IN FINGER OF RIGHT HAND: ICD-10-CM

## 2020-06-24 LAB
ALBUMIN SERPL BCP-MCNC: 4 G/DL (ref 3.5–5)
ALP SERPL-CCNC: 70 U/L (ref 46–116)
ALT SERPL W P-5'-P-CCNC: 26 U/L (ref 12–78)
ANION GAP SERPL CALCULATED.3IONS-SCNC: 3 MMOL/L (ref 4–13)
AST SERPL W P-5'-P-CCNC: 15 U/L (ref 5–45)
BILIRUB SERPL-MCNC: 0.41 MG/DL (ref 0.2–1)
BUN SERPL-MCNC: 16 MG/DL (ref 5–25)
CALCIUM SERPL-MCNC: 9.5 MG/DL (ref 8.3–10.1)
CHLORIDE SERPL-SCNC: 107 MMOL/L (ref 100–108)
CHOLEST SERPL-MCNC: 146 MG/DL (ref 50–200)
CO2 SERPL-SCNC: 29 MMOL/L (ref 21–32)
CREAT SERPL-MCNC: 1.11 MG/DL (ref 0.6–1.3)
CRP SERPL QL: <3 MG/L
ERYTHROCYTE [DISTWIDTH] IN BLOOD BY AUTOMATED COUNT: 11.9 % (ref 11.6–15.1)
GFR SERPL CREATININE-BSD FRML MDRD: 78 ML/MIN/1.73SQ M
GLUCOSE P FAST SERPL-MCNC: 91 MG/DL (ref 65–99)
HCT VFR BLD AUTO: 48.4 % (ref 36.5–49.3)
HDLC SERPL-MCNC: 32 MG/DL
HGB BLD-MCNC: 16.4 G/DL (ref 12–17)
LDLC SERPL CALC-MCNC: 93 MG/DL (ref 0–100)
MCH RBC QN AUTO: 33.2 PG (ref 26.8–34.3)
MCHC RBC AUTO-ENTMCNC: 33.9 G/DL (ref 31.4–37.4)
MCV RBC AUTO: 98 FL (ref 82–98)
PLATELET # BLD AUTO: 159 THOUSANDS/UL (ref 149–390)
PMV BLD AUTO: 8.5 FL (ref 8.9–12.7)
POTASSIUM SERPL-SCNC: 3.8 MMOL/L (ref 3.5–5.3)
PROT SERPL-MCNC: 7.8 G/DL (ref 6.4–8.2)
RBC # BLD AUTO: 4.94 MILLION/UL (ref 3.88–5.62)
RHEUMATOID FACT SER QL LA: NEGATIVE
SODIUM SERPL-SCNC: 139 MMOL/L (ref 136–145)
TRIGL SERPL-MCNC: 107 MG/DL
TSH SERPL DL<=0.05 MIU/L-ACNC: 2.27 UIU/ML (ref 0.36–3.74)
URATE SERPL-MCNC: 5.7 MG/DL (ref 4.2–8)
WBC # BLD AUTO: 4.27 THOUSAND/UL (ref 4.31–10.16)

## 2020-06-24 PROCEDURE — 86200 CCP ANTIBODY: CPT

## 2020-06-24 PROCEDURE — 80053 COMPREHEN METABOLIC PANEL: CPT

## 2020-06-24 PROCEDURE — 84550 ASSAY OF BLOOD/URIC ACID: CPT

## 2020-06-24 PROCEDURE — 85027 COMPLETE CBC AUTOMATED: CPT

## 2020-06-24 PROCEDURE — 36415 COLL VENOUS BLD VENIPUNCTURE: CPT

## 2020-06-24 PROCEDURE — 86430 RHEUMATOID FACTOR TEST QUAL: CPT

## 2020-06-24 PROCEDURE — 86140 C-REACTIVE PROTEIN: CPT

## 2020-06-24 PROCEDURE — 84443 ASSAY THYROID STIM HORMONE: CPT

## 2020-06-24 PROCEDURE — 80061 LIPID PANEL: CPT

## 2020-06-26 ENCOUNTER — TELEPHONE (OUTPATIENT)
Dept: FAMILY MEDICINE CLINIC | Facility: CLINIC | Age: 50
End: 2020-06-26

## 2020-06-26 DIAGNOSIS — M25.441 SWELLING OF FINGER JOINT OF RIGHT HAND: Primary | ICD-10-CM

## 2020-06-26 LAB — CCP IGA+IGG SERPL IA-ACNC: 7 UNITS (ref 0–19)

## 2020-06-28 RX ORDER — PANTOPRAZOLE SODIUM 40 MG/1
TABLET, DELAYED RELEASE ORAL
Qty: 30 TABLET | Refills: 2 | Status: SHIPPED | OUTPATIENT
Start: 2020-06-28 | End: 2021-06-24

## 2020-11-20 ENCOUNTER — TELEPHONE (OUTPATIENT)
Dept: FAMILY MEDICINE CLINIC | Facility: CLINIC | Age: 50
End: 2020-11-20

## 2020-11-20 DIAGNOSIS — N40.0 BENIGN PROSTATIC HYPERPLASIA WITHOUT LOWER URINARY TRACT SYMPTOMS: ICD-10-CM

## 2020-11-20 RX ORDER — DOXAZOSIN MESYLATE 4 MG/1
4 TABLET ORAL
Qty: 15 TABLET | Refills: 0 | Status: SHIPPED | OUTPATIENT
Start: 2020-11-20 | End: 2021-01-25 | Stop reason: SDUPTHER

## 2021-01-25 DIAGNOSIS — N40.0 BENIGN PROSTATIC HYPERPLASIA WITHOUT LOWER URINARY TRACT SYMPTOMS: ICD-10-CM

## 2021-01-25 RX ORDER — DOXAZOSIN MESYLATE 4 MG/1
4 TABLET ORAL
Qty: 90 TABLET | Refills: 3 | Status: SHIPPED | OUTPATIENT
Start: 2021-01-25 | End: 2022-01-10 | Stop reason: SDUPTHER

## 2021-06-15 ENCOUNTER — TELEPHONE (OUTPATIENT)
Dept: ADMINISTRATIVE | Facility: OTHER | Age: 51
End: 2021-06-15

## 2021-06-15 NOTE — TELEPHONE ENCOUNTER
----- Message from Keon Rodriguez, [facility] sent at 6/14/2021  2:08 PM EDT -----  Regarding: care gap request-Colonoscopy  06/14/21 2:08 PM    Hello, our patient attached above has had CRC: Colonoscopy completed/performed  Please assist in updating the patient chart by making an External outreach to [facility] [address] [facility] , [facility] [address] [facility], [address] [place] [address], facility located in [place], [place]  The date of service is 06/07/2021, Tel #: [phone]  Although pt is only 53, MD is asking we please obtain records and update HM accordingly       Thank you,  Keon Rodriguez MA  [facility] [place] [facility]

## 2021-06-15 NOTE — LETTER
Procedure Request Form: Colonoscopy      Date Requested: 21  Patient: Manpreet Queen  Patient : 1970   Referring Provider: Wallace Caballero, MD        Date of Procedure ______________________________       The above patient has informed us that they have completed their   most recent Colonoscopy at your facility  Please complete   this form and attach all corresponding procedure reports/results  Comments __________________________________________________________  ____________________________________________________________________  ____________________________________________________________________  ____________________________________________________________________    Facility Completing Procedure _________________________________________    Form Completed By (print name) _______________________________________      Signature __________________________________________________________      These reports are needed for  compliance    Please fax this completed form and a copy of the procedure report to our office located at [place] [phone] as soon as possible to [phone] nunu Dyer: Phone [phone]    We thank you for your assistance in treating our mutual patient      Margaret Acharya [facility] [phone] F [phone]

## 2021-06-18 NOTE — TELEPHONE ENCOUNTER
Upon review of the In Basket request and the patient's chart, initial outreach has been made via fax, please see Contacts section for details       Thank you  Renaot Chung

## 2021-06-22 NOTE — TELEPHONE ENCOUNTER
Upon review of the In Basket request we were able to locate, review, and update the patient chart as requested for CRC: Colonoscopy  Any additional questions or concerns should be emailed to the Practice Liaisons via [email or web address] email, please do not reply via In Basket      Thank you  Alyse Contreras

## 2021-06-24 ENCOUNTER — OFFICE VISIT (OUTPATIENT)
Dept: FAMILY MEDICINE CLINIC | Facility: CLINIC | Age: 51
End: 2021-06-24
Payer: COMMERCIAL

## 2021-06-24 VITALS
DIASTOLIC BLOOD PRESSURE: 80 MMHG | OXYGEN SATURATION: 98 % | HEART RATE: 73 BPM | BODY MASS INDEX: 25.11 KG/M2 | WEIGHT: 175.4 LBS | SYSTOLIC BLOOD PRESSURE: 136 MMHG | TEMPERATURE: 97.5 F | HEIGHT: 70 IN | RESPIRATION RATE: 17 BRPM

## 2021-06-24 DIAGNOSIS — K21.9 GASTROESOPHAGEAL REFLUX DISEASE WITHOUT ESOPHAGITIS: ICD-10-CM

## 2021-06-24 DIAGNOSIS — Z00.00 ENCOUNTER FOR WELLNESS EXAMINATION IN ADULT: Primary | ICD-10-CM

## 2021-06-24 DIAGNOSIS — N40.0 BPH WITHOUT URINARY OBSTRUCTION: ICD-10-CM

## 2021-06-24 DIAGNOSIS — Z13.220 ENCOUNTER FOR SCREENING FOR LIPID DISORDER: ICD-10-CM

## 2021-06-24 DIAGNOSIS — Z12.5 PROSTATE CANCER SCREENING: ICD-10-CM

## 2021-06-24 PROCEDURE — 3725F SCREEN DEPRESSION PERFORMED: CPT | Performed by: FAMILY MEDICINE

## 2021-06-24 PROCEDURE — 99396 PREV VISIT EST AGE 40-64: CPT | Performed by: FAMILY MEDICINE

## 2021-06-24 PROCEDURE — 3008F BODY MASS INDEX DOCD: CPT | Performed by: FAMILY MEDICINE

## 2021-06-24 PROCEDURE — 1036F TOBACCO NON-USER: CPT | Performed by: FAMILY MEDICINE

## 2021-06-24 RX ORDER — OMEPRAZOLE 20 MG/1
20 CAPSULE, DELAYED RELEASE ORAL DAILY
COMMUNITY
Start: 2021-05-11

## 2021-06-24 NOTE — PROGRESS NOTES
FAMILY PRACTICE OFFICE VISIT       NAME: Brian Corado  AGE: 53 y o  SEX: male       : 1970        MRN: [id number]        Assessment and Plan     1  Encounter for wellness examination in adult    2  Prostate cancer screening  -     PSA, Total Screen; Future    3  Gastroesophageal reflux disease without esophagitis  Assessment & Plan:    Recent EGD reveals small hiatal hernia  Patient reports significant improvement of symptoms with start of omeprazole 20 mg daily  He is describing symptoms of oropharyngeal dysphagia  Referral to ENT for further evaluation  We had long discussion regarding importance of bland diet, and anti-reflux measures  Orders:  -     CBC and differential; Future  -     Comprehensive metabolic panel; Future  -     TSH, 3rd generation; Future  -     Ambulatory Referral to Otolaryngology; Future    4  Encounter for screening for lipid disorder  -     Lipid Panel with Direct LDL reflex; Future    5  BPH without urinary obstruction  Assessment & Plan:    Continue Cardura 4 mg daily       BMI Counseling: Body mass index is 25 17 kg/m²  The BMI is above normal  Nutrition recommendations include encouraging healthy choices of fruits and vegetables, consuming healthier snacks, moderation in carbohydrate intake and reducing intake of cholesterol  Exercise recommendations include moderate physical activity 150 minutes/week and exercising 3-5 times per week  There are no Patient Instructions on file for this visit  No follow-ups on file  Discussed with the patient and all questioned fully answered  He will call me if any problems arise  M*Modal software was used to dictate this note  It may contain errors with dictating incorrect words/spelling  Please contact provider directly with any questions  Chief Complaint   No chief complaint on file        History of Present Illness     Patient presents for annual well exam     Daily medications include Cardura 4 mg daily  Patient underwent RECENT  COLONOSCOPY,PATIENT  HAD 2  POLYPS -BENIGN  No findings on EGD aside from small hiatal hernia  Patient has been experiencing intermittent symptoms of acid reflux  GI has prescribed Omeprazole 20 mg daily for 3 months and advised to observe  Patient is complaining of acid reflux / burning sensation and feeling of lump in the back of the throat  Intermittent s/o dysphagia    Patient reports previous nocturnal awakenings due to acid reflux, he reports significant improvement of symptoms after start of omeprazole  Patient will proceed with routine blood work  He firmly declines COVID-19 vaccination, counseling provided, I strongly advised patient to consider  patient denies symptoms of chest pain, palpitations, shortness of breath or dizziness  Review of Systems   Review of Systems   Constitutional: Negative  HENT: Negative  Eyes: Negative  Respiratory: Negative  Cardiovascular: Negative  Gastrointestinal: Negative  Intermittent symptoms of acid reflux, as per HPI   Endocrine: Negative  Genitourinary: Negative  Musculoskeletal: Negative  Skin: Negative  Allergic/Immunologic: Negative  Neurological: Negative  Hematological: Negative  Psychiatric/Behavioral: Negative          Active Problem List     Patient Active Problem List   Diagnosis    Hematuria, microscopic    BPH without urinary obstruction    Carpal tunnel syndrome, bilateral    Thoracic outlet syndrome    Upper back pain on left side    Gastroesophageal reflux disease without esophagitis    Swelling of finger joint of right hand       Past Medical History:  Past Medical History:   Diagnosis Date    Hypertension        Past Surgical History:  Past Surgical History:   Procedure Laterality Date    COLONOSCOPY      HERNIA REPAIR      TONSILLECTOMY         Family History:  Family History   Problem Relation Age of Onset    Cancer Mother         Bladder    Hypertension Mother     Skin cancer Father     Arthritis Father     Diabetes Brother     Rheum arthritis Brother     Hypertension Brother        Social History:  Social History     Socioeconomic History    Marital status: /Civil Union     Spouse name: Not on file    Number of children: Not on file    Years of education: Not on file    Highest education level: Not on file   Occupational History    Not on file   Tobacco Use    Smoking status: Never Smoker    Smokeless tobacco: Never Used   Substance and Sexual Activity    Alcohol use: Yes     Comment: Social    Drug use: No    Sexual activity: Not on file   Other Topics Concern    Not on file   Social History Narrative    Not on file     Social Determinants of Health     Financial Resource Strain:     Difficulty of Paying Living Expenses:    Food Insecurity:     Worried About Running Out of Food in the Last Year:     [address] in the Last Year:    Transportation Needs:     Lack of Transportation (Medical):      Lack of Transportation (Non-Medical):    Physical Activity:     Days of Exercise per Week:     Minutes of Exercise per Session:    Stress:     Feeling of Stress :    Social Connections:     Frequency of Communication with Friends and Family:     Frequency of Social Gatherings with Friends and Family:     Attends [employer] Services:     Active Member of Clubs or Organizations:     Attends Club or Organization Meetings:     Marital Status:    Intimate Partner Violence:     Fear of Current or Ex-Partner:     Emotionally Abused:     Physically Abused:     Sexually Abused:          Objective     Vitals:    06/24/21 1632 06/24/21 1708   BP: (!) 136/102 136/80   BP Location: Left arm    Patient Position: Sitting    Cuff Size: Adult    Pulse: 73    Resp: 17    Temp: 97 5 °F (36 4 °C)    TempSrc: Temporal    SpO2: 98%    Weight: 79 6 kg (175 lb 6 4 oz)    Height: 5' 10" (1 778 m)        Wt Readings from Last 3 Encounters: 06/24/21 79 6 kg (175 lb 6 4 oz)   06/23/20 81 6 kg (179 lb 12 8 oz)   10/23/19 81 6 kg (180 lb)       Physical Exam  Vitals and nursing note reviewed  Constitutional:       General: He is not in acute distress  Appearance: Normal appearance  He is well-developed  He is not ill-appearing  HENT:      Head: Normocephalic and atraumatic  Eyes:      General: No scleral icterus  Conjunctiva/sclera: Conjunctivae normal    Neck:      Vascular: No carotid bruit  Cardiovascular:      Rate and Rhythm: Normal rate and regular rhythm  Heart sounds: Normal heart sounds  No murmur heard  Pulmonary:      Effort: Pulmonary effort is normal  No respiratory distress  Breath sounds: Normal breath sounds  No wheezing or rales  Abdominal:      General: Bowel sounds are normal  There is no distension or abdominal bruit  Palpations: Abdomen is soft  Tenderness: There is no abdominal tenderness  Musculoskeletal:         General: Normal range of motion  Cervical back: Normal range of motion and neck supple  No rigidity  Right lower leg: No edema  Left lower leg: No edema  Skin:     General: Skin is warm  Findings: No rash  Neurological:      General: No focal deficit present  Mental Status: He is alert and oriented to person, place, and time  Cranial Nerves: No cranial nerve deficit  Psychiatric:         Mood and Affect: Mood normal          Behavior: Behavior normal          Thought Content:  Thought content normal           Pertinent Laboratory/Diagnostic Studies:    Lab Results   Component Value Date    WBC 4 27 (L) 06/24/2020    HGB 16 4 06/24/2020    HCT 48 4 06/24/2020    MCV 98 06/24/2020     06/24/2020       No results found for: TSH    No results found for: CHOL  Lab Results   Component Value Date    TRIG 107 06/24/2020     Lab Results   Component Value Date    HDL 32 (L) 06/24/2020     Lab Results   Component Value Date    LDLCALC 93 06/24/2020 No results found for: HGBA1C  Lab Results   Component Value Date    SODIUM 139 06/24/2020    K 3 8 06/24/2020     06/24/2020    CO2 29 06/24/2020    AGAP 3 (L) 06/24/2020    BUN 16 06/24/2020    CREATININE 1 11 06/24/2020    GLUF 91 06/24/2020    CALCIUM 9 5 06/24/2020    AST 15 06/24/2020    ALT 26 06/24/2020    ALKPHOS 70 06/24/2020    TP 7 8 06/24/2020    TBILI 0 41 06/24/2020    EGFR 78 06/24/2020       Orders Placed This Encounter   Procedures    CBC and differential    Comprehensive metabolic panel    Lipid Panel with Direct LDL reflex    TSH, 3rd generation    PSA, Total Screen    Ambulatory Referral to Otolaryngology       ALLERGIES:  No Known Allergies    Current Medications     Current Outpatient Medications   Medication Sig Dispense Refill    doxazosin (CARDURA) 4 mg tablet Take 1 tablet (4 mg total) by mouth daily at bedtime 90 tablet 3    Magnesium 100 MG CAPS Take by mouth      Multiple Vitamin (MULTIVITAMINS PO) Take 1 capsule by mouth daily      omeprazole (PriLOSEC) 20 mg delayed release capsule Take 20 mg by mouth daily       No current facility-administered medications for this visit         Medications Discontinued During This Encounter   Medication Reason    celecoxib (CeleBREX) 200 mg capsule     cyclobenzaprine (FLEXERIL) 10 mg tablet     pantoprazole (PROTONIX) 40 mg tablet        Health Maintenance     Health Maintenance   Topic Date Due    Hepatitis C Screening  Never done    COVID-19 Vaccine (1) Never done    HIV Screening  Never done    BMI: Followup Plan  Never done    DTaP,Tdap,and Td Vaccines (1 - Tdap) Never done    Annual Physical  06/23/2021    Influenza Vaccine (Season Ended) 09/01/2021    Depression Screening PHQ  06/24/2022    BMI: Adult  06/24/2022    Colorectal Cancer Screening  06/07/2031    Pneumococcal Vaccine: Pediatrics (0 to 5 Years) and At-Risk Patients (6 to 59 Years)  Aged Out    HIB Vaccine  Aged Out    Hepatitis B Vaccine  Aged [facility]  IPV Vaccine  Aged Out    Hepatitis A Vaccine  Aged Out    Meningococcal ACWY Vaccine  Aged Out    HPV Vaccine  Aged Out         There is no immunization history on file for this patient      Autumn Merino MD

## 2021-06-29 PROBLEM — K21.9 GASTROESOPHAGEAL REFLUX DISEASE WITHOUT ESOPHAGITIS: Chronic | Status: ACTIVE | Noted: 2020-06-23

## 2021-06-30 NOTE — ASSESSMENT & PLAN NOTE
Recent EGD reveals small hiatal hernia  Patient reports significant improvement of symptoms with start of omeprazole 20 mg daily  He is describing symptoms of oropharyngeal dysphagia  Referral to ENT for further evaluation  We had long discussion regarding importance of bland diet, and anti-reflux measures

## 2022-01-10 DIAGNOSIS — N40.0 BENIGN PROSTATIC HYPERPLASIA WITHOUT LOWER URINARY TRACT SYMPTOMS: ICD-10-CM

## 2022-01-10 RX ORDER — DOXAZOSIN MESYLATE 4 MG/1
4 TABLET ORAL
Qty: 90 TABLET | Refills: 3 | Status: SHIPPED | OUTPATIENT
Start: 2022-01-10

## 2022-12-20 ENCOUNTER — NURSE TRIAGE (OUTPATIENT)
Dept: OTHER | Facility: OTHER | Age: 52
End: 2022-12-20

## 2022-12-20 ENCOUNTER — OFFICE VISIT (OUTPATIENT)
Dept: URGENT CARE | Facility: CLINIC | Age: 52
End: 2022-12-20

## 2022-12-20 VITALS
OXYGEN SATURATION: 97 % | HEART RATE: 98 BPM | WEIGHT: 165 LBS | HEIGHT: 70 IN | RESPIRATION RATE: 15 BRPM | TEMPERATURE: 98.4 F | BODY MASS INDEX: 23.62 KG/M2

## 2022-12-20 DIAGNOSIS — B02.9 HERPES ZOSTER WITHOUT COMPLICATION: Primary | ICD-10-CM

## 2022-12-20 RX ORDER — VALACYCLOVIR HYDROCHLORIDE 1 G/1
1000 TABLET, FILM COATED ORAL 3 TIMES DAILY
Qty: 21 TABLET | Refills: 0 | Status: SHIPPED | OUTPATIENT
Start: 2022-12-20 | End: 2022-12-27

## 2022-12-20 NOTE — TELEPHONE ENCOUNTER
Patient believes he has shingles on his back and abdomen  He started with discomfort in those areas 4 days ago and started with a rash today  He is currently at work and would like a call back to advise  Reason for Disposition  • Shingles rash (matches SYMPTOMS) and onset within past 72 hours    Answer Assessment - Initial Assessment Questions  1  APPEARANCE of RASH: "Describe the rash "       Red, linear/concentrated   2  LOCATION: "Where is the rash located?"       Back, abdomen  3  ONSET: "When did the rash start?"       Sensitivity x 4 days, rash today   4  ITCHING: "Does the rash itch?" If Yes, ask: "How bad is the itch?"  (Scale 1-10; or mild, moderate, severe)      Denies   5  PAIN: "Does the rash hurt?" If Yes, ask: "How bad is the pain?"  (Scale 1-10; or mild, moderate, severe)      Mild (discomfort)  6  OTHER SYMPTOMS: "Do you have any other symptoms?" (e g , fever)      Denies   7   PREGNANCY: "Is there any chance you are pregnant?" "When was your last menstrual period?"      N/A    Protocols used: [facility]

## 2022-12-20 NOTE — TELEPHONE ENCOUNTER
Regarding: Shingles  ----- Message from Babar Dejesus sent at 12/20/2022  8:00 AM EST -----  "I think I have shingles   I had some sensitivities on my back and abdomen and I woke up with a red rash in those areas "

## 2022-12-20 NOTE — PATIENT INSTRUCTIONS
--Take Rx anti-viral as prescribed  --Motrin as needed for pain/discomfort  --Cover lesions with OTC non-stick (Telfa) dressing  --Avoid contact with infants, pregnant woman, those who have never had chicken pox  --Expect gradual resolution over the next 1-2 weeks  --Seek re-evaluation if no improvement/worsening

## 2022-12-20 NOTE — PROGRESS NOTES
[facility]        NAME: Isabelle Campos is a 47 y o  male  : 1970    MRN: [id number]  DATE: 2022  TIME: 3:51 PM    Assessment and Plan   Herpes zoster without complication [U09 7]  1  Herpes zoster without complication  valACYclovir (VALTREX) 1,000 mg tablet        --Infectious considerations discussed  Patient Instructions     --Take Rx anti-viral as prescribed  --Motrin as needed for pain/discomfort  --Cover lesions with OTC non-stick (Telfa) dressing  --Avoid contact with infants, pregnant woman, those who have never had chicken pox  --Expect gradual resolution over the next 1-2 weeks  --Seek re-evaluation if no improvement/worsening  Chief Complaint     Chief Complaint   Patient presents with   • Rash     Pt had hypersensitivity to his left back and abdomen, he woke up this morning with a rash on his abd and side  History of Present Illness       Here with complaints of red rash on left side of trunk since yesterday  Somewhat sensitive to touch  Non-pruritic  Feels fine otherwise  No fever/chills, body aches, headache, fatigue  No rash noted elsewhere  No known infectious or contact precipitants  No OTC treatments  Had chicken pox when younger  Review of Systems   Review of Systems   Constitutional: Negative for fever  Musculoskeletal: Negative for myalgias  Skin: Positive for rash  Neurological: Negative for headaches           Current Medications       Current Outpatient Medications:   •  doxazosin (CARDURA) 4 mg tablet, Take 1 tablet (4 mg total) by mouth daily at bedtime, Disp: 90 tablet, Rfl: 3  •  valACYclovir (VALTREX) 1,000 mg tablet, Take 1 tablet (1,000 mg total) by mouth 3 (three) times a day for 7 days, Disp: 21 tablet, Rfl: 0  •  Magnesium 100 MG CAPS, Take by mouth, Disp: , Rfl:   •  Multiple Vitamin (MULTIVITAMINS PO), Take 1 capsule by mouth daily, Disp: , Rfl:   •  omeprazole (PriLOSEC) 20 mg delayed release capsule, Take 20 mg by mouth daily (Patient not taking: Reported on 12/20/2022), Disp: , Rfl:     Current Allergies     Allergies as of 12/20/2022   • (No Known Allergies)            The following portions of the patient's history were reviewed and updated as appropriate: allergies, current medications, past family history, past medical history, past social history, past surgical history and problem list      Past Medical History:   Diagnosis Date   • Hypertension        Past Surgical History:   Procedure Laterality Date   • COLONOSCOPY     • HERNIA REPAIR     • TONSILLECTOMY         Family History   Problem Relation Age of Onset   • Cancer Mother         Bladder   • Hypertension Mother    • Skin cancer Father    • Arthritis Father    • Diabetes Brother    • Rheum arthritis Brother    • Hypertension Brother          Medications have been verified  Objective   Pulse 98   Temp 98 4 °F (36 9 °C)   Resp 15   Ht 5' 9 5" (1 765 m)   Wt 74 8 kg (165 lb)   SpO2 97%   BMI 24 02 kg/m²   No LMP for male patient  Physical Exam     Physical Exam  Constitutional:       General: He is not in acute distress  Pulmonary:      Effort: Pulmonary effort is normal    Skin:     Findings: Rash present  Comments: Left side of back/flank/abdomen with scattered grouped vesicles on erythematous base in T10/11 dermatomal distribution, somewhat tender to touch  No rash, lesions noted elsewhere including right side of body  Neurological:      Mental Status: He is alert     Psychiatric:         Mood and Affect: Mood normal

## 2022-12-22 NOTE — PATIENT INSTRUCTIONS
We discussed the following possible diagnoses:  1  Cervical radiculopathy  2  Myofascial pain syndrome  3   Scapular dyskinesia AMS

## 2023-01-16 DIAGNOSIS — N40.0 BENIGN PROSTATIC HYPERPLASIA WITHOUT LOWER URINARY TRACT SYMPTOMS: ICD-10-CM

## 2023-01-17 RX ORDER — DOXAZOSIN MESYLATE 4 MG/1
TABLET ORAL
Qty: 90 TABLET | Refills: 0 | Status: SHIPPED | OUTPATIENT
Start: 2023-01-17

## 2023-02-17 ENCOUNTER — OFFICE VISIT (OUTPATIENT)
Dept: FAMILY MEDICINE CLINIC | Facility: CLINIC | Age: 53
End: 2023-02-17

## 2023-02-17 VITALS
SYSTOLIC BLOOD PRESSURE: 120 MMHG | BODY MASS INDEX: 25.48 KG/M2 | RESPIRATION RATE: 16 BRPM | HEIGHT: 70 IN | TEMPERATURE: 98.5 F | DIASTOLIC BLOOD PRESSURE: 78 MMHG | HEART RATE: 77 BPM | OXYGEN SATURATION: 98 % | WEIGHT: 178 LBS

## 2023-02-17 DIAGNOSIS — Z00.00 WELL ADULT EXAM: Primary | ICD-10-CM

## 2023-02-17 DIAGNOSIS — R53.83 OTHER FATIGUE: ICD-10-CM

## 2023-02-17 DIAGNOSIS — N40.0 BPH WITHOUT URINARY OBSTRUCTION: ICD-10-CM

## 2023-02-17 DIAGNOSIS — Z12.5 PROSTATE CANCER SCREENING: ICD-10-CM

## 2023-02-17 DIAGNOSIS — Z13.220 ENCOUNTER FOR SCREENING FOR LIPID DISORDER: ICD-10-CM

## 2023-02-17 NOTE — PROGRESS NOTES
Name: Chiqui Griffiths      : 1970      MRN: [id number]  Encounter Provider: Sourav Mott MD  Encounter Date: 2023   Encounter department: [address] [place] [address]     1  Well adult exam    2  BPH without urinary obstruction  Assessment & Plan:  Symptoms are well controlled on Cardura 4 mg nightly      3  Other fatigue  -     CBC and differential; Future  -     Comprehensive metabolic panel; Future  -     TSH, 3rd generation; Future    4  Encounter for screening for lipid disorder  -     Lipid Panel with Direct LDL reflex; Future    5  Prostate cancer screening  -     PSA, Total Screen; Future      Annual well exam    Patient has been feeling generally well  Proceed with routine blood work  Patient is up-to-date with colonoscopy  Subjective      Annual well exam   UTD with  colonoscopy   Due for labs - last set of labs 2020  Shingles in 2020--left chest wall, no significant postherpetic neuropathy, no symptoms at present     Uses Cardura  qhs- helps with BPH symptoms    Sees an eye doctor    No concerns or complaints today  Patient has been feeling generally well  Review of Systems   Constitutional: Negative  HENT: Negative  Eyes: Negative  Respiratory: Negative  Cardiovascular: Negative  Gastrointestinal: Negative  Endocrine: Negative  Genitourinary:        As per HPI   Musculoskeletal: Negative  Skin: Negative  Allergic/Immunologic: Negative  Neurological: Negative  Hematological: Negative  Psychiatric/Behavioral: Negative          Past Medical History:   Diagnosis Date   • Hypertension      Past Surgical History:   Procedure Laterality Date   • COLONOSCOPY     • HERNIA REPAIR     • TONSILLECTOMY       Family History   Problem Relation Age of Onset   • Cancer Mother         Bladder   • Hypertension Mother    • Skin cancer Father    • Arthritis Father    • Diabetes Brother    • Rheum arthritis Brother    • Hypertension Brother      Social History     Socioeconomic History   • Marital status: /Civil Union     Spouse name: None   • Number of children: None   • Years of education: None   • Highest education level: None   Occupational History   • None   Tobacco Use   • Smoking status: Never   • Smokeless tobacco: Never   Vaping Use   • Vaping Use: Never used   Substance and Sexual Activity   • Alcohol use: Yes     Comment: Social   • Drug use: No   • Sexual activity: None   Other Topics Concern   • None   Social History Narrative   • None     Social Determinants of Health     Financial Resource Strain: Not on file   Food Insecurity: Not on file   Transportation Needs: Not on file   Physical Activity: Not on file   Stress: Not on file   Social Connections: Not on file   Intimate Partner Violence: Not on file   Housing Stability: Not on file     Current Outpatient Medications on File Prior to Visit   Medication Sig   • doxazosin (CARDURA) 4 mg tablet TAKE 1 TABLET DAILY AT BEDTIME   • Magnesium 100 MG CAPS Take by mouth   • Multiple Vitamin (MULTIVITAMINS PO) Take 1 capsule by mouth daily     No Known Allergies    There is no immunization history on file for this patient  Objective     /78 (BP Location: Left arm, Patient Position: Sitting, Cuff Size: Standard)   Pulse 77   Temp 98 5 °F (36 9 °C) (Temporal)   Resp 16   Ht 5' 9 95" (1 777 m)   Wt 80 7 kg (178 lb)   SpO2 98%   BMI 25 58 kg/m²     Physical Exam  Vitals and nursing note reviewed  Constitutional:       Appearance: Normal appearance  He is well-developed  HENT:      Head: Normocephalic and atraumatic  Eyes:      Conjunctiva/sclera: Conjunctivae normal       Pupils: Pupils are equal, round, and reactive to light  Neck:      Thyroid: No thyromegaly  Vascular: No carotid bruit  Cardiovascular:      Rate and Rhythm: Normal rate and regular rhythm  Heart sounds: Normal heart sounds  No murmur heard       Comments: 130/78  Pulmonary:      Effort: Pulmonary effort is normal  No respiratory distress  Breath sounds: Normal breath sounds  No wheezing or rales  Abdominal:      General: Bowel sounds are normal  There is no distension  Palpations: Abdomen is soft  Tenderness: There is no abdominal tenderness  Hernia: No hernia is present  Musculoskeletal:         General: Normal range of motion  Cervical back: Neck supple  No rigidity  Right lower leg: No edema  Left lower leg: No edema  Skin:     Findings: No rash  Neurological:      General: No focal deficit present  Mental Status: He is alert and oriented to person, place, and time  Cranial Nerves: No cranial nerve deficit  Psychiatric:         Mood and Affect: Mood normal          Behavior: Behavior normal          Thought Content:  Thought content normal          Judgment: Judgment normal        Abby Brown MD

## 2023-09-05 ENCOUNTER — OFFICE VISIT (OUTPATIENT)
Dept: FAMILY MEDICINE CLINIC | Facility: CLINIC | Age: 53
End: 2023-09-05
Payer: COMMERCIAL

## 2023-09-05 VITALS
OXYGEN SATURATION: 97 % | HEART RATE: 78 BPM | WEIGHT: 170 LBS | SYSTOLIC BLOOD PRESSURE: 150 MMHG | RESPIRATION RATE: 16 BRPM | HEIGHT: 70 IN | BODY MASS INDEX: 24.34 KG/M2 | TEMPERATURE: 98.4 F | DIASTOLIC BLOOD PRESSURE: 90 MMHG

## 2023-09-05 DIAGNOSIS — N40.0 BPH WITHOUT URINARY OBSTRUCTION: Primary | ICD-10-CM

## 2023-09-05 DIAGNOSIS — M54.42 CHRONIC BILATERAL LOW BACK PAIN WITH LEFT-SIDED SCIATICA: ICD-10-CM

## 2023-09-05 DIAGNOSIS — G89.29 CHRONIC BILATERAL LOW BACK PAIN WITH LEFT-SIDED SCIATICA: ICD-10-CM

## 2023-09-05 PROCEDURE — 99214 OFFICE O/P EST MOD 30 MIN: CPT | Performed by: FAMILY MEDICINE

## 2023-09-05 NOTE — PROGRESS NOTES
Name: Tavo Vasquez      : 1970      MRN: [id number]  Encounter Provider: Cornelio Solares MD  Encounter Date: 2023   Encounter department: [address] [place] [address]     1. BPH without urinary obstruction  Assessment & Plan:  Patient presents for evaluation of increased symptoms of hesitancy, incomplete bladder emptying, nocturia, he urinates in small amounts. He reports that the urge to urinate is somewhat blunted. Continue Cardura 4 mg nightly. Patient will benefit from reevaluation by urology. Check kidney/bladder ultrasound with PVR. Patient should proceed with routine blood work including PSA. UA is pending    Orders:  -     US kidney and bladder with pvr; Future; Expected date: 2023  -     Ambulatory referral to Urology; Future    2. Chronic bilateral low back pain with left-sided sciatica  Assessment & Plan:  Worsening of chronic low back pain, primarily localized on the left. Symptoms of left sciatica few weeks ago, improved. No saddle paresthesias. Aside from worsening of chronic urinary symptoms, overall patient has normal bowel and bladder functions. Clinically I doubt cauda equina. I recommend to proceed with physical therapy for 4 to 6 weeks with subsequent MRI of lumbar spine for further evaluation. Stretching exercises. PRN ibuprofen. Orders:  -     MRI lumbar spine wo contrast; Future; Expected date: 2023  -     Ambulatory referral to Physical Therapy; Future           Subjective      Patient with longstanding history of urinary symptoms. He had extensive work-up 10 to 15 years ago and was seen by a few urologists. His latest regular urologist was Dr. Mary Beth Reeder who has retired. Patient has been on Cardura 4 mg nightly for many years. He ishere today reporting worsening of urinary symptoms within past 2 months.   Patient states that often enough he is not experiencing urge to urinate, yet when he goes to the bathroom he realizes that his bladder is full. He usually urinates every 2 hours due to concern of urinary retention and fear of bladder distention requiring a catheter. Patient states that often enough urge to urinate is blunted. He also reports worsening of hesitancy, takes him up to 5 seconds to start urination, stream is weaker with occasional dribbling. He feels that there might be a restriction to the urinary flow. Nocturia, 2-3 times per night. Patient urinates in small amounts. No dysuria, gross hematuria, flank pain or fever. Patient has been experiencing increased symptoms of low back pain lately. At first low back pain was radiating to the right side but lately has been more localized on the left side with left-sided sciatica a few weeks ago which was quite bothersome. Currently sciatica symptoms have resolved. Low back pain is persisting. Worse with prolonged sitting or standing or positional changes. No saddle paresthesias. Normal bowel movements. Patient has been doing research online and is here today due to concern of about possible cauda equina syndrome. Review of Systems   Constitutional: Negative. HENT: Negative. Eyes: Negative. Respiratory: Negative. Cardiovascular: Negative. Gastrointestinal: Negative. Genitourinary: Positive for decreased urine volume, difficulty urinating and frequency. Negative for dysuria, hematuria and urgency. As per HPI   Musculoskeletal: Positive for back pain. Neurological:        Left sciatica, lasting few weeks, resolved   Psychiatric/Behavioral: Negative.         Past Medical History:   Diagnosis Date   • Allergic April   • Arthritis April   • Hypertension    • Shingles December     Past Surgical History:   Procedure Laterality Date   • COLONOSCOPY     • HERNIA REPAIR     • TONSILLECTOMY       Family History   Problem Relation Age of Onset   • Cancer Mother         Bladder   • Hypertension Mother    • Skin cancer Father    • Arthritis Father    • Diabetes Brother    • Rheum arthritis Brother    • Hypertension Brother    • Diabetes Brother    • Arthritis Brother    • Arthritis Maternal Grandmother    • Arthritis Paternal Uncle      Social History     Socioeconomic History   • Marital status: /Civil Union     Spouse name: None   • Number of children: None   • Years of education: None   • Highest education level: None   Occupational History   • None   Tobacco Use   • Smoking status: Never   • Smokeless tobacco: Never   Vaping Use   • Vaping Use: Never used   Substance and Sexual Activity   • Alcohol use: Yes     Comment: Social   • Drug use: No   • Sexual activity: Yes     Partners: Female     Birth control/protection: None   Other Topics Concern   • None   Social History Narrative   • None     Social Determinants of Health     Financial Resource Strain: Not on file   Food Insecurity: Not on file   Transportation Needs: Not on file   Physical Activity: Not on file   Stress: Not on file   Social Connections: Not on file   Intimate Partner Violence: Not on file   Housing Stability: Not on file     Current Outpatient Medications on File Prior to Visit   Medication Sig   • Docusate Sodium (STOOL SOFTENER LAXATIVE PO) Take by mouth   • doxazosin (CARDURA) 4 mg tablet TAKE 1 TABLET DAILY AT BEDTIME   • Magnesium 100 MG CAPS Take by mouth   • Multiple Vitamin (MULTIVITAMINS PO) Take 1 capsule by mouth daily     No Known Allergies    There is no immunization history on file for this patient. Objective     /90 (BP Location: Left arm, Patient Position: Sitting, Cuff Size: Standard)   Pulse 78   Temp 98.4 °F (36.9 °C) (Temporal)   Resp 16   Ht 5' 9.95" (1.777 m)   Wt 77.1 kg (170 lb)   SpO2 97%   BMI 24.43 kg/m²     Physical Exam  Vitals and nursing note reviewed. Constitutional:       General: He is not in acute distress. Appearance: Normal appearance. He is not ill-appearing.    Neurological:      General: No focal deficit present. Mental Status: He is alert and oriented to person, place, and time.    Psychiatric:         Mood and Affect: Mood normal.         Behavior: Behavior normal.       Ina Nance MD

## 2023-09-08 PROBLEM — G89.29 CHRONIC BILATERAL LOW BACK PAIN WITH LEFT-SIDED SCIATICA: Status: ACTIVE | Noted: 2023-09-08

## 2023-09-08 PROBLEM — M54.42 CHRONIC BILATERAL LOW BACK PAIN WITH LEFT-SIDED SCIATICA: Status: ACTIVE | Noted: 2023-09-08

## 2023-09-08 NOTE — ASSESSMENT & PLAN NOTE
Worsening of chronic low back pain, primarily localized on the left. Symptoms of left sciatica few weeks ago, improved. No saddle paresthesias. Aside from worsening of chronic urinary symptoms, overall patient has normal bowel and bladder functions. Clinically I doubt cauda equina. I recommend to proceed with physical therapy for 4 to 6 weeks with subsequent MRI of lumbar spine for further evaluation. Stretching exercises. PRN ibuprofen.

## 2023-09-08 NOTE — ASSESSMENT & PLAN NOTE
Patient presents for evaluation of increased symptoms of hesitancy, incomplete bladder emptying, nocturia, he urinates in small amounts. He reports that the urge to urinate is somewhat blunted. Continue Cardura 4 mg nightly. Patient will benefit from reevaluation by urology. Check kidney/bladder ultrasound with PVR. Patient should proceed with routine blood work including PSA.   UA is pending

## 2023-09-12 ENCOUNTER — EVALUATION (OUTPATIENT)
Dept: PHYSICAL THERAPY | Facility: REHABILITATION | Age: 53
End: 2023-09-12
Payer: COMMERCIAL

## 2023-09-12 DIAGNOSIS — G89.29 CHRONIC BILATERAL LOW BACK PAIN WITH LEFT-SIDED SCIATICA: ICD-10-CM

## 2023-09-12 DIAGNOSIS — M54.42 CHRONIC BILATERAL LOW BACK PAIN WITH LEFT-SIDED SCIATICA: ICD-10-CM

## 2023-09-12 PROCEDURE — 97162 PT EVAL MOD COMPLEX 30 MIN: CPT

## 2023-09-12 PROCEDURE — 97110 THERAPEUTIC EXERCISES: CPT

## 2023-09-12 NOTE — PROGRESS NOTES
PT Evaluation     Today's date: 2023  Patient name: Jo Burnham  : 1970  MRN: [id number]  Referring provider: Lianna Thompson MD  Dx:   Encounter Diagnosis     ICD-10-CM    1. Chronic bilateral low back pain with left-sided sciatica  M54.42 Ambulatory referral to Physical Therapy    G89.29           Start Time: 1535  Stop Time: 1625  Total time in clinic (min): 50 minutes    Assessment  Assessment details: Problem List:  1) Neural tension  2) Lumbar hypomobility    Jo Burnham is a pleasant 47 y.o. male who presents with chronic low back pain with referred pain and directional preference for extension. He has neural tension through his left lower extremity, and limited lumbar range of motion into extension. Despite radicular pain, and reported urinary retention, he does not present with other neurologic deficits. His nerve tension and hypomobility result in the pain he is experiencing, worry over not knowing what's wrong, concern at no signs of improvement, fear of not being able to keep active and future ill health (and wanting to prevent it). No further referral appears necessary at this time based upon examination results. I expect he will have moderate improvement with skilled physical therapy. Positive prognostic indicators include good understanding of diagnosis and treatment plan options. Negative prognostic indicators include chronicity of symptoms, anxiety and multiple prior failed treatments. Comparable signs:  1) Positive straight leg raise with tibial nerve bias at 50 degrees hip flexion  2) Posterior thigh pain in full squat position  Impairments: abnormal or restricted ROM, abnormal movement, activity intolerance, impaired physical strength, lacks appropriate home exercise program and pain with function    Symptom irritability: lowUnderstanding of Dx/Px/POC: good   Prognosis: fair    Goals  Short Term Goals:   1.  Patient will demonstrate independence with HEP by providing return demonstration of exercises  2. Patient will report decreased symptom intensity during activity by 50%  3. Patient will perform full squat without symptom onset    Long Term Goals:   1. Patient will improve FOTO to greater then goal  2. Patient will improve pain with activity to 2/10 or less  3. Patient will continue with HEP to allow for continued progress and function      Plan  Plan details: Initiate physical therapy at a frequency of 1x per week for 6 weeks, tapering to every other week per patient response. Patient would benefit from: skilled physical therapy  Referral necessary: No  Planned modality interventions: biofeedback, cryotherapy, thermotherapy: hydrocollator packs and TENS  Planned therapy interventions: joint mobilization, manual therapy, muscle pump exercises, neuromuscular re-education, patient education, strengthening, stretching, therapeutic activities, therapeutic exercise, home exercise program, graded exercise, graded activity, gait training, functional ROM exercises, flexibility, body mechanics training, balance, abdominal trunk stabilization, activity modification, motor coordination training and nerve gliding  Frequency: 1x week  Duration in weeks: 6  Plan of Care beginning date: 9/12/2023  Plan of Care expiration date: 10/24/2023  Treatment plan discussed with: patient        Subjective Evaluation    History of Present Illness  Mechanism of injury: Zahira Carranza says his back pain started about 17 years ago and has usually been on the right side. He notes prior to this, in his 30's, he developed bladder problems and had difficulty going to the bathroom. He was treated by a chiropractor and eventually got relief from his back pain, but the urinary symptoms continued. He was seen by neurologists, had MRIs and was seen by urologists without explanation for his symptoms.  He would experience occasional "brown outs" with his bladder sensation, where he would develop strange sensations in his abdomen and bladder, and pins and needles into his groin. Recently, his urinary symptoms got worse, and he developed left sided back pain that radiated down to his leg. His symptoms have improved significantly since they started, however he continues to get symptoms with squatting, picking up or carrying heavier objects, and will occasionally get tingling into his middle three toes. With this recent episode, he denies fecal incontinence, sensory changes, lower extremity weakness but endorses urinary retention, and altered sensation in his genitalia. He denies sexual dysfunction. Recurrent probem    Quality of life: good    Patient Goals  Patient goals for therapy: increased motion, decreased pain and return to sport/leisure activities    Pain  Current pain ratin  At best pain ratin  At worst pain ratin  Exacerbated by: squatting. Objective    Dermatome: (L/R):    L1: intact to light touch and pinprick bilaterally  L2: intact to light touch and pinprick bilaterally  L3: intact to light touch and pinprick bilaterally  L4: intact to light touch and pinprick bilaterally  L5: intact to light touch and pinprick bilaterally  S1: intact to light touch and pinprick bilaterally     Reflexes:  (L/R)   L3-4:   2+ bilaterally       S1:   2+ bilaterally  C5,6: 2+ on the right, 0+ on the left  C6: 2+ on the right, 0+ on the left  Miles's sign: negative           Babinski= negative      GAIT: patient ambulates with grossly normal gait  Squat assess: Patient is able to squat with full range of motion, and depth appears to be limited by soft tissue approximation of posterior thigh on posterior lower leg. Lumbar  % of normal   Flex. 100   Extn. 50   SG Left 75   SG Right 75- r.  Thigh pain   Repeated Movements  Prone  extension= improved    Flexion= not tested          MMT    Hip       L       R   Flex. (L1,L2,L3) 4+ 4+   Knee     Ext (L3) 5 5   Flex (S2) 4+ 4+        Ankle/foot     DF (L4) 5 5   PF (S1) 4 4   Sup     Pro     G.  Toe (L5) 5 5   Comments:    Heel toe walking: negative for heel drop/ toe drop    Neuro Dynamic Testing:  Slump test: L= positive with tibial nerve bias    R= negative        Straight leg raise:   L= positive     R= negative         Precautions: Standard, BPH      Manuals 9/12                                                                Neuro Re-Ed                                                                                                        Ther Ex             REIL 1x10            REIL self OP 1x10            REIL clin OP 1x10                                                                eduaction 10'            Ther Activity                                       Gait Training                                       Modalities

## 2023-09-15 ENCOUNTER — HOSPITAL ENCOUNTER (OUTPATIENT)
Dept: ULTRASOUND IMAGING | Facility: HOSPITAL | Age: 53
Discharge: HOME | End: 2023-09-15
Payer: COMMERCIAL

## 2023-09-15 DIAGNOSIS — N40.0 BPH WITHOUT URINARY OBSTRUCTION: ICD-10-CM

## 2023-09-15 PROCEDURE — 76770 US EXAM ABDO BACK WALL COMP: CPT

## 2023-09-16 ENCOUNTER — APPOINTMENT (OUTPATIENT)
Dept: LAB | Facility: CLINIC | Age: 53
End: 2023-09-16
Payer: COMMERCIAL

## 2023-09-16 DIAGNOSIS — R53.83 OTHER FATIGUE: ICD-10-CM

## 2023-09-16 DIAGNOSIS — Z12.5 PROSTATE CANCER SCREENING: ICD-10-CM

## 2023-09-16 DIAGNOSIS — Z13.220 ENCOUNTER FOR SCREENING FOR LIPID DISORDER: ICD-10-CM

## 2023-09-16 LAB
ALBUMIN SERPL BCP-MCNC: 4.2 G/DL (ref 3.5–5)
ALP SERPL-CCNC: 51 U/L (ref 34–104)
ALT SERPL W P-5'-P-CCNC: 13 U/L (ref 7–52)
ANION GAP SERPL CALCULATED.3IONS-SCNC: 8 MMOL/L
AST SERPL W P-5'-P-CCNC: 15 U/L (ref 13–39)
BASOPHILS # BLD AUTO: 0.05 THOUSANDS/ÂΜL (ref 0–0.1)
BASOPHILS NFR BLD AUTO: 1 % (ref 0–1)
BILIRUB SERPL-MCNC: 0.82 MG/DL (ref 0.2–1)
BUN SERPL-MCNC: 18 MG/DL (ref 5–25)
CALCIUM SERPL-MCNC: 9.3 MG/DL (ref 8.4–10.2)
CHLORIDE SERPL-SCNC: 103 MMOL/L (ref 96–108)
CHOLEST SERPL-MCNC: 150 MG/DL
CO2 SERPL-SCNC: 31 MMOL/L (ref 21–32)
CREAT SERPL-MCNC: 1.2 MG/DL (ref 0.6–1.3)
EOSINOPHIL # BLD AUTO: 0.14 THOUSAND/ÂΜL (ref 0–0.61)
EOSINOPHIL NFR BLD AUTO: 3 % (ref 0–6)
ERYTHROCYTE [DISTWIDTH] IN BLOOD BY AUTOMATED COUNT: 11.8 % (ref 11.6–15.1)
GFR SERPL CREATININE-BSD FRML MDRD: 69 ML/MIN/1.73SQ M
GLUCOSE P FAST SERPL-MCNC: 100 MG/DL (ref 65–99)
HCT VFR BLD AUTO: 47.9 % (ref 36.5–49.3)
HDLC SERPL-MCNC: 39 MG/DL
HGB BLD-MCNC: 16.4 G/DL (ref 12–17)
IMM GRANULOCYTES # BLD AUTO: 0.01 THOUSAND/UL (ref 0–0.2)
IMM GRANULOCYTES NFR BLD AUTO: 0 % (ref 0–2)
LDLC SERPL CALC-MCNC: 100 MG/DL (ref 0–100)
LYMPHOCYTES # BLD AUTO: 1.19 THOUSANDS/ÂΜL (ref 0.6–4.47)
LYMPHOCYTES NFR BLD AUTO: 28 % (ref 14–44)
MCH RBC QN AUTO: 33.4 PG (ref 26.8–34.3)
MCHC RBC AUTO-ENTMCNC: 34.2 G/DL (ref 31.4–37.4)
MCV RBC AUTO: 98 FL (ref 82–98)
MONOCYTES # BLD AUTO: 0.34 THOUSAND/ÂΜL (ref 0.17–1.22)
MONOCYTES NFR BLD AUTO: 8 % (ref 4–12)
NEUTROPHILS # BLD AUTO: 2.46 THOUSANDS/ÂΜL (ref 1.85–7.62)
NEUTS SEG NFR BLD AUTO: 60 % (ref 43–75)
NRBC BLD AUTO-RTO: 0 /100 WBCS
PLATELET # BLD AUTO: 178 THOUSANDS/UL (ref 149–390)
PMV BLD AUTO: 8.4 FL (ref 8.9–12.7)
POTASSIUM SERPL-SCNC: 3.7 MMOL/L (ref 3.5–5.3)
PROT SERPL-MCNC: 7.5 G/DL (ref 6.4–8.4)
PSA SERPL-MCNC: 0.51 NG/ML (ref 0–4)
RBC # BLD AUTO: 4.91 MILLION/UL (ref 3.88–5.62)
SODIUM SERPL-SCNC: 142 MMOL/L (ref 135–147)
TRIGL SERPL-MCNC: 57 MG/DL
TSH SERPL DL<=0.05 MIU/L-ACNC: 1.61 UIU/ML (ref 0.45–4.5)
WBC # BLD AUTO: 4.19 THOUSAND/UL (ref 4.31–10.16)

## 2023-09-16 PROCEDURE — 80053 COMPREHEN METABOLIC PANEL: CPT

## 2023-09-16 PROCEDURE — 36415 COLL VENOUS BLD VENIPUNCTURE: CPT

## 2023-09-16 PROCEDURE — G0103 PSA SCREENING: HCPCS

## 2023-09-16 PROCEDURE — 84443 ASSAY THYROID STIM HORMONE: CPT

## 2023-09-16 PROCEDURE — 80061 LIPID PANEL: CPT

## 2023-09-16 PROCEDURE — 85025 COMPLETE CBC W/AUTO DIFF WBC: CPT

## 2023-09-19 ENCOUNTER — OFFICE VISIT (OUTPATIENT)
Dept: PHYSICAL THERAPY | Facility: REHABILITATION | Age: 53
End: 2023-09-19
Payer: COMMERCIAL

## 2023-09-19 DIAGNOSIS — G89.29 CHRONIC BILATERAL LOW BACK PAIN WITH LEFT-SIDED SCIATICA: Primary | ICD-10-CM

## 2023-09-19 DIAGNOSIS — M54.42 CHRONIC BILATERAL LOW BACK PAIN WITH LEFT-SIDED SCIATICA: Primary | ICD-10-CM

## 2023-09-19 PROCEDURE — 97110 THERAPEUTIC EXERCISES: CPT

## 2023-09-19 PROCEDURE — 97140 MANUAL THERAPY 1/> REGIONS: CPT

## 2023-09-19 NOTE — PROGRESS NOTES
Daily Note     Today's date: 2023  Patient name: Sravani Ann  : 1970  MRN: [id number]  Referring provider: Angela Parr MD  Dx:   Encounter Diagnosis     ICD-10-CM    1. Chronic bilateral low back pain with left-sided sciatica  M54.42     G89.29           Start Time: 1745  Stop Time: 1830  Total time in clinic (min): 45 minutes    Subjective: Tito Lyons says he has been more aware of his left foot lately, but thinks its because of how he is sitting. He is wondering if a manipulation would help him. Objective: See treatment diary below      L. Side glide produces left sided stretching back pain. Positive L SLR. Assessment: Tito Lyons continues to present with neural tension and posterior thigh symptoms with full squat. He appears to improve with force progressions and unilateral manual therapy, experiencing an improvement in SLR during session. However, patient continues to experience symptom onset at end range of hip flexion, and mild symptoms with deep squat. Incorporated nerve tensioner exercise to assist with decreasing mechanical sensitivity of his nerve. Discussed potential symptoms resulting from progressions, and how to regress exercises should symptoms worsen. Tito Lyons would benefit from continued skilled physical therapy. Plan: Continue per plan of care. Precautions: Standard, BPH      Manuals            Extension mob  LM L4           UPA  L L4, L5 Gr IV LM prone 6'                                     Neuro Re-Ed                                                                                                        Ther Ex             REIL 1x10            REIL self OP 1x10            REIL clin OP 1x10            REIL hips offset  L.  Side glider           REIL hips offset self OP  3x10           Nerve tensioner  1x10           Standing extension  3x10/ 1x10 hips offset           eduaction 10' 10'           Ther Activity                                       Gait Training                                       Modalities

## 2023-09-26 ENCOUNTER — OFFICE VISIT (OUTPATIENT)
Dept: PHYSICAL THERAPY | Facility: REHABILITATION | Age: 53
End: 2023-09-26
Payer: COMMERCIAL

## 2023-09-26 DIAGNOSIS — M54.42 CHRONIC BILATERAL LOW BACK PAIN WITH LEFT-SIDED SCIATICA: Primary | ICD-10-CM

## 2023-09-26 DIAGNOSIS — G89.29 CHRONIC BILATERAL LOW BACK PAIN WITH LEFT-SIDED SCIATICA: Primary | ICD-10-CM

## 2023-09-26 PROCEDURE — 97140 MANUAL THERAPY 1/> REGIONS: CPT

## 2023-09-26 PROCEDURE — 97110 THERAPEUTIC EXERCISES: CPT

## 2023-09-26 NOTE — PROGRESS NOTES
Daily Note     Today's date: 2023  Patient name: Mykel Nance  : 1970  MRN: [id number]  Referring provider: June Crisostomo MD  Dx:   Encounter Diagnosis     ICD-10-CM    1. Chronic bilateral low back pain with left-sided sciatica  M54.42     G89.29           Start Time: 1755  Stop Time: 1830  Total time in clinic (min): 35 minutes    Subjective: Deandre Masterson says his back has been about the same. He still gets occasional numbness in his left foot, but hasn't noticed it during his exercises. He says he does stretching and a core workout with situps in the morning, and noticed a stretch down his right leg. He also is worried his core tensing during the day is throwing his posture off, and he feels a little better when he relaxes. Objective: See treatment diary below    Lumbar AROM  Flex: 100  Ext: 50  SG R: 90 stretch on left hip  SG L: 90 Pinch left    Repeated flexion in sitting: Patient's standing lumbar extension worsens after performing 20 repetitions in sitting. Segmental motion: patient has hypomobility throughout mid and lower lumbar segments to [facility] spring testing, however patient reports less pain. ANTT: negative on the left    Assessment: Incorporated low grade manual therapy to improve segmental mobility with patient experiencing decreased stiffness during active range of motion. Performed supine HVLA mobilization to reduce neural tension and improve global mobility. Discussed potential risks with patient, with patient providing verbal understanding of risks. HVLA mobilization had mild effect on patient's lumbar active range of motion and neural tension. Patient appears to have symptomatic posterior derangement, as lumbar active range of motion diminishes after flexion based exercises. While patient's comparable signs have improve with extensions utilizing lateral component, he appears to have hit a plateau with lateral force progression.  Regressed patient's repeated motions to sagittal plane to improve mobility and reduce neural tension. Patient's decrease in symptoms when relaxing is likely due to reduced stiffness and improved segmental mobility. Froylan Brink would benefit from continued skilled physical therapy. Plan: Continue per plan of care. NV: assess multifidus contraction, and review muscular relaxation techniques. Precautions: Standard, BPH      Manuals 9/12 9/19 9/26          Extension mob  LM L4           UPA  L L4, L5 Gr IV LM prone 6'           L/s mobs   R s/l rotation GR III-IV L4-L5 LM 2x          T/s mobs   supine T6 HVLA LM          Central tensioner   attempted  LM L4,L5 d/c          Assessment   RFIS, lumbar AROM throughout          Neuro Re-Ed                                                                                                        Ther Ex             REIL 1x10  2x10          REIL self OP 1x10            REIL clin OP 1x10            REIL hips offset  L.  Side glider           REIL hips offset self OP  3x10           Nerve tensioner  1x10 Slump 2x10          Standing extension  3x10/ 1x10 hips offset           eduaction 10' 10' 5'          Ther Activity                                       Gait Training                                       Modalities

## 2023-10-05 ENCOUNTER — APPOINTMENT (OUTPATIENT)
Dept: PHYSICAL THERAPY | Facility: REHABILITATION | Age: 53
End: 2023-10-05
Payer: COMMERCIAL

## 2023-10-09 ENCOUNTER — OFFICE VISIT (OUTPATIENT)
Dept: PHYSICAL THERAPY | Facility: REHABILITATION | Age: 53
End: 2023-10-09
Payer: COMMERCIAL

## 2023-10-09 ENCOUNTER — OFFICE VISIT (OUTPATIENT)
Dept: UROLOGY | Facility: CLINIC | Age: 53
End: 2023-10-09
Payer: COMMERCIAL

## 2023-10-09 VITALS
SYSTOLIC BLOOD PRESSURE: 140 MMHG | HEIGHT: 70 IN | OXYGEN SATURATION: 97 % | HEART RATE: 78 BPM | DIASTOLIC BLOOD PRESSURE: 86 MMHG | WEIGHT: 170 LBS | BODY MASS INDEX: 24.34 KG/M2

## 2023-10-09 DIAGNOSIS — N40.0 BPH WITHOUT URINARY OBSTRUCTION: Primary | ICD-10-CM

## 2023-10-09 DIAGNOSIS — G89.29 CHRONIC BILATERAL LOW BACK PAIN WITH LEFT-SIDED SCIATICA: Primary | ICD-10-CM

## 2023-10-09 DIAGNOSIS — M54.42 CHRONIC BILATERAL LOW BACK PAIN WITH LEFT-SIDED SCIATICA: Primary | ICD-10-CM

## 2023-10-09 LAB
SL AMB  POCT GLUCOSE, UA: NORMAL
SL AMB LEUKOCYTE ESTERASE,UA: NORMAL
SL AMB POCT BILIRUBIN,UA: NORMAL
SL AMB POCT BLOOD,UA: NORMAL
SL AMB POCT CLARITY,UA: CLEAR
SL AMB POCT COLOR,UA: YELLOW
SL AMB POCT KETONES,UA: NORMAL
SL AMB POCT NITRITE,UA: NORMAL
SL AMB POCT PH,UA: 6
SL AMB POCT SPECIFIC GRAVITY,UA: 1015
SL AMB POCT URINE PROTEIN: NORMAL
SL AMB POCT UROBILINOGEN: NORMAL

## 2023-10-09 PROCEDURE — 97110 THERAPEUTIC EXERCISES: CPT

## 2023-10-09 PROCEDURE — 99203 OFFICE O/P NEW LOW 30 MIN: CPT | Performed by: PHYSICIAN ASSISTANT

## 2023-10-09 PROCEDURE — 81002 URINALYSIS NONAUTO W/O SCOPE: CPT | Performed by: PHYSICIAN ASSISTANT

## 2023-10-09 PROCEDURE — 97140 MANUAL THERAPY 1/> REGIONS: CPT | Performed by: PHYSICAL THERAPIST

## 2023-10-09 NOTE — PROGRESS NOTES
UROLOGY CONSULTATION NOTE     Patient Identifiers: Nasra Nance (MRN: [id number])  Service Requesting Consultation:   Service Providing Consultation:  Urology, Pinky Portillo PA-C  Consults  Date of Service: 10/9/2023    Reason for Consultation:     History of Present Illness:     Nasra Nance is a 47 y.o. male known to me not seen since 2019. He has a history of microscopic hematuria. He had an evaluation in [place] about 15 years ago. He is referred from primary care for evaluation of his voiding dysfunction. He had left-sided back pain with concomitant sciatica and this exacerbated his lower urinary tract symptoms. Once his pain was resolved his symptoms returned to baseline. He has been managed on Cardura 4 mg. He had a kidney and bladder ultrasound which showed no hydronephrosis mass or stone.   We discussed his 2.5 cm simple renal cyst.    Past Medical, Past Surgical History:     Past Medical History:   Diagnosis Date   • Allergic April   • Arthritis April   • Hypertension    • Shingles December   :    Past Surgical History:   Procedure Laterality Date   • COLONOSCOPY     • HERNIA REPAIR     • TONSILLECTOMY     :    Medications, Allergies:     Current Outpatient Medications:   •  Docusate Sodium (STOOL SOFTENER LAXATIVE PO), Take by mouth, Disp: , Rfl:   •  doxazosin (CARDURA) 4 mg tablet, TAKE 1 TABLET DAILY AT BEDTIME, Disp: 90 tablet, Rfl: 3  •  Magnesium 100 MG CAPS, Take by mouth, Disp: , Rfl:   •  Multiple Vitamin (MULTIVITAMINS PO), Take 1 capsule by mouth daily, Disp: , Rfl:     Allergies:  No Known Allergies:    Social and Family History:   Social History:   Social History     Tobacco Use   • Smoking status: Never   • Smokeless tobacco: Never   Vaping Use   • Vaping Use: Never used   Substance Use Topics   • Alcohol use: Yes     Comment: Social   • Drug use: No   .    Social History     Tobacco Use   Smoking Status Never   Smokeless Tobacco Never       Family History:  Family History   Problem Relation Age of Onset   • Cancer Mother         Bladder   • Hypertension Mother    • Skin cancer Father    • Arthritis Father    • Diabetes Brother    • Rheum arthritis Brother    • Hypertension Brother    • Diabetes Brother    • Arthritis Brother    • Arthritis Maternal Grandmother    • Arthritis Paternal Uncle    :     Review of Systems:     General: Fever, chills, or night sweats: negative  Cardiac: Negative for chest pain. Pulmonary: Negative for shortness of breath. Gastrointestinal: Abdominal pain negative. Nausea, vomiting, or diarrhea negative,  Genitourinary: See HPI above. Patient does not have hematuria. All other systems queried were negative. Physical Exam:   General: Patient is pleasant and in NAD. Awake and alert  /86 (BP Location: Left arm, Patient Position: Sitting, Cuff Size: Standard)   Pulse 78   Ht 5' 9.95" (1.777 m)   Wt 77.1 kg (170 lb)   SpO2 97%   BMI 24.43 kg/m²   HEENT:  Conjunctiva are clear  Constitutional:  pleasant and cooperative     no apparent distress  Cardiac: Peripheral edema: negative  Pulmonary: Non-labored breathing  Abdomen: Soft, non-tender, non-distended. No surgical scars. No masses, tenderness, hernias noted. Genitourinary: Negative CVA tenderness, negative suprapubic tenderness. Extremities:  Moves all extremities  Neurological:CNII-XII intact. No numbness or tingling. Essentially non focal neurologic exam  Psychiatric:mood affect and behavior normal    (Male): Penis circumcised, phallus normal, meatus patent. Testicles descended into scrotum bilaterally without masses nor tenderness. No inguinal hernias bilaterally. NATHALIE: Prostate is not enlarged at 35 grams. The prostate is not boggy. The prostate is not tender. No nodules noted.     Labs:     Lab Results   Component Value Date    HGB 16.4 09/16/2023    HCT 47.9 09/16/2023    WBC 4.19 (L) 09/16/2023     09/16/2023   ]    Lab Results   Component Value Date    K 3.7 09/16/2023     09/16/2023    CO2 31 09/16/2023    BUN 18 09/16/2023    CREATININE 1.20 09/16/2023    CALCIUM 9.3 09/16/2023   ]    Imaging:   I personally reviewed the images and report of the following studies, and reviewed them with the patient:   IMPRESSION:     1. No hydronephrosis. 2. Mild prostatomegaly. 3. No significant post void residual bladder volume. Calculated post void bladder volume: 21.6 cc    ASSESSMENT:     #1. BPH with lower urinary tract symptoms. PLAN:   -I reviewed the findings with Glenroy Diaz  -There is nothing concerning about his PSA or his prostate exam  -We reviewed his kidney and bladder ultrasound which has no concerning findings  -I offered him follow-up in 1 year if he would like  -He prefers to call if he has any further problems or questions      Thank you for allowing me to participate in this patients’ care. Please do not hesitate to call with any additional questions.   Martha Portillo PA-C

## 2023-10-09 NOTE — PROGRESS NOTES
Daily Note     Today's date: 10/9/2023  Patient name: Ladonna Hall  : 1970  MRN: [id number]  Referring provider: Jackie Carter MD  Dx:   Encounter Diagnosis     ICD-10-CM    1. Chronic bilateral low back pain with left-sided sciatica  M54.42     G89.29                      Subjective: Everything has been feeling much better since Friday afternoon, sx are centralized without LBP. Casual walking while camping this weekened, short walks without any issues. AM stiffness resolves with HEP. Squatting is pain free, without the usual tightness in the LE. He is noticing increased L scapular pain, and       Objective: See treatment diary below      Assessment: Nichole Grant is able to complete session without peritonealization of radicular sx, or increase LBP. Added core work to promote DLS. Patient continues to respond well to T/s and L/s mobs. Nichole Grant would benefit from continued skilled physical therapy. Plan: Continue per plan of care. Precautions: Standard, BPH      Manuals 9/12 9/19 9/26 10/9         Extension mob  LM L4           UPA  L L4, L5 Gr IV LM prone 6'           L/s mobs   R s/l rotation GR III-IV L4-L5 LM 2x supine L4-L5 DS         T/s mobs   supine T6 HVLA LM supine T6  DS         Central tensioner   attempted  LM L4,L5 d/c          Assessment   RFIS, lumbar AROM throughout          Neuro Re-Ed             TrA brace    10x5"         TrA SLR    2x10 bilat         TrA Marching    x10         Bird dogs     2x10                                                Ther Ex             REIL 1x10  2x10 2x01         REIL self OP 1x10   x10         REIL clin OP 1x10            REIL hips offset  L.  Side glider           REIL hips offset self OP  3x10           Nerve tensioner  1x10 Slump 2x10          Standing extension  3x10/ 1x10 hips offset           Open books    10x bilat         eduaction 10' 10' 5'          Ther Activity                                       Gait Training Modalities

## 2023-10-17 ENCOUNTER — APPOINTMENT (OUTPATIENT)
Dept: PHYSICAL THERAPY | Facility: REHABILITATION | Age: 53
End: 2023-10-17
Payer: COMMERCIAL

## 2023-10-20 ENCOUNTER — HOSPITAL ENCOUNTER (OUTPATIENT)
Dept: MRI IMAGING | Facility: HOSPITAL | Age: 53
Discharge: HOME | End: 2023-10-20
Payer: COMMERCIAL

## 2023-10-20 DIAGNOSIS — G89.29 CHRONIC BILATERAL LOW BACK PAIN WITH LEFT-SIDED SCIATICA: ICD-10-CM

## 2023-10-20 DIAGNOSIS — M54.42 CHRONIC BILATERAL LOW BACK PAIN WITH LEFT-SIDED SCIATICA: ICD-10-CM

## 2023-10-20 PROCEDURE — G1004 CDSM NDSC: HCPCS

## 2023-10-20 PROCEDURE — 72148 MRI LUMBAR SPINE W/O DYE: CPT

## 2023-10-26 DIAGNOSIS — G89.29 CHRONIC BILATERAL LOW BACK PAIN WITH LEFT-SIDED SCIATICA: Primary | ICD-10-CM

## 2023-10-26 DIAGNOSIS — M54.42 CHRONIC BILATERAL LOW BACK PAIN WITH LEFT-SIDED SCIATICA: Primary | ICD-10-CM

## 2023-10-26 PROBLEM — M51.369 DDD (DEGENERATIVE DISC DISEASE), LUMBAR: Status: ACTIVE | Noted: 2023-10-26

## 2023-10-26 PROBLEM — M51.36 DDD (DEGENERATIVE DISC DISEASE), LUMBAR: Status: ACTIVE | Noted: 2023-10-26

## 2023-11-24 ENCOUNTER — CONSULT (OUTPATIENT)
Dept: PAIN MEDICINE | Facility: CLINIC | Age: 53
End: 2023-11-24
Payer: COMMERCIAL

## 2023-11-24 VITALS
HEART RATE: 75 BPM | SYSTOLIC BLOOD PRESSURE: 138 MMHG | DIASTOLIC BLOOD PRESSURE: 79 MMHG | BODY MASS INDEX: 24.34 KG/M2 | HEIGHT: 70 IN | WEIGHT: 170 LBS

## 2023-11-24 DIAGNOSIS — M47.816 LUMBAR SPONDYLOSIS: Primary | ICD-10-CM

## 2023-11-24 DIAGNOSIS — M54.40 LOW BACK PAIN WITH SCIATICA, SCIATICA LATERALITY UNSPECIFIED, UNSPECIFIED BACK PAIN LATERALITY, UNSPECIFIED CHRONICITY: ICD-10-CM

## 2023-11-24 DIAGNOSIS — M43.16 SPONDYLOLISTHESIS, LUMBAR REGION: ICD-10-CM

## 2023-11-24 DIAGNOSIS — M48.061 LUMBAR FORAMINAL STENOSIS: ICD-10-CM

## 2023-11-24 PROCEDURE — 99244 OFF/OP CNSLTJ NEW/EST MOD 40: CPT | Performed by: ANESTHESIOLOGY

## 2023-11-24 NOTE — PROGRESS NOTES
Assessment  1. Lumbar spondylosis    2. Low back pain with sciatica, sciatica laterality unspecified, unspecified back pain laterality, unspecified chronicity    3. Spondylolisthesis, lumbar region    4. Lumbar foraminal stenosis        Plan  44-year-old male referred by Dr. Laurence Manzo, presenting for initial consultation regarding a 5-month history of lumbosacral back pain that was radiating into the left lower extremity to the foot with associated numbness in the second through fourth toes. He denies any trauma or inciting event. He has had similar symptoms like this in the past, however it typically involve the right lower extremity. He recently was engaged in physical therapy which essentially resolved his back and leg symptoms with the exception of the numbness in his toes which does not really bother him. MRI of the lumbar spine demonstrates multilevel spondylosis. Mild right foraminal stenosis at L2-3 and mild bilateral foraminal stenosis at L3-4 and L4-5. Anterolisthesis of L5 on S1 with disc bulge producing moderate right foraminal stenosis and encroachment of the right L5 root. Aside from numbness in the second through fourth toes on the left foot, the patient is essentially asymptomatic today. His exam is benign other than the numbness. This may be secondary to radiculopathy versus peripheral neuropathy. 1.  The patient will continue with home exercise program  2. If the patient's symptoms return, the patient was instructed to contact our office for follow-up office visit and reevaluation  3. Patient will follow-up on a as needed basis    My impressions and treatment recommendations were discussed in detail with the patient who verbalized understanding and had no further questions. Discharge instructions were provided. I personally saw and examined the patient and I agree with the above discussed plan of care. No orders of the defined types were placed in this encounter.     No orders of the defined types were placed in this encounter. History of Present Illness    Mary Preston is a 47 y.o. male referred by Dr. Abdoulaye Chowdhury, presenting for initial consultation regarding a 5-month history of lumbosacral back pain that was radiating into the left lower extremity to the foot with associated numbness in the second through fourth toes. He denies any trauma or inciting event. He has had similar symptoms like this in the past, however it typically involve the right lower extremity. He recently was engaged in physical therapy which essentially resolved his back and leg symptoms with the exception of the numbness in his toes which does not really bother him. He denies any right lower extremity symptoms, bladder or bowel incontinence, or saddle anesthesia. The patient was having some urinary hesitancy which is somewhat improved. MRI of the lumbar spine demonstrates multilevel spondylosis. Mild right foraminal stenosis at L2-3 and mild bilateral foraminal stenosis at L3-4 and L4-5. Anterolisthesis of L5 on S1 with disc bulge producing moderate right foraminal stenosis and encroachment of the right L5 root. The patient rates his pain a 1 out of 10 and the pain is occasional.  The pain does not follow any particular pattern throughout the day. The pain is described as sharp, pressure-like, dull, aching, and numbness. The pain is increased with bending and sitting. The pain is alleviated with prayer, physical therapy, and chiropractic treatment. Other than as stated above, the patient denies any interval changes in medications, medical condition, mental condition, symptoms, or allergies since the last office visit. I have personally reviewed and/or updated the patient's past medical history, past surgical history, family history, social history, current medications, allergies, and vital signs today. Review of Systems   Genitourinary:  Positive for dysuria and hematuria. Neurological:  Positive for numbness. All other systems reviewed and are negative. Patient Active Problem List   Diagnosis    Hematuria, microscopic    BPH without urinary obstruction    Carpal tunnel syndrome, bilateral    Thoracic outlet syndrome    Upper back pain on left side    Gastroesophageal reflux disease without esophagitis    Swelling of finger joint of right hand    Chronic bilateral low back pain with left-sided sciatica    DDD (degenerative disc disease), lumbar       Past Medical History:   Diagnosis Date    Allergic April    Arthritis April    Hypertension     Shingles December       Past Surgical History:   Procedure Laterality Date    COLONOSCOPY      HERNIA REPAIR      TONSILLECTOMY         Family History   Problem Relation Age of Onset    Cancer Mother         Bladder    Hypertension Mother     Skin cancer Father     Arthritis Father     Diabetes Brother     Rheum arthritis Brother     Hypertension Brother     Diabetes Brother     Arthritis Brother     Arthritis Maternal Grandmother     Arthritis Paternal Uncle        Social History     Occupational History    Not on file   Tobacco Use    Smoking status: Never    Smokeless tobacco: Never   Vaping Use    Vaping Use: Never used   Substance and Sexual Activity    Alcohol use: Yes     Comment: Social    Drug use: No    Sexual activity: Yes     Partners: Female     Birth control/protection: None       Current Outpatient Medications on File Prior to Visit   Medication Sig    Docusate Sodium (STOOL SOFTENER LAXATIVE PO) Take by mouth    doxazosin (CARDURA) 4 mg tablet TAKE 1 TABLET DAILY AT BEDTIME    Magnesium 100 MG CAPS Take by mouth    Multiple Vitamin (MULTIVITAMINS PO) Take 1 capsule by mouth daily     No current facility-administered medications on file prior to visit.        No Known Allergies    Physical Exam    /79 (BP Location: Left arm, Patient Position: Sitting, Cuff Size: Standard)   Pulse 75   Ht 5' 10" (1.778 m)   Wt 77.1 kg (170 lb)   BMI 24.39 kg/m²     Constitutional: normal, well developed, well nourished, alert, in no distress and non-toxic and no overt pain behavior. Eyes: anicteric  HEENT: grossly intact  Neck: supple, symmetric, trachea midline and no masses   Pulmonary:even and unlabored  Cardiovascular:No edema or pitting edema present  Skin:Normal without rashes or lesions and well hydrated  Psychiatric:Mood and affect appropriate  Neurologic:Cranial Nerves II-XII grossly intact  Musculoskeletal:normal gait. Bilateral lumbar paraspinals nontender to palpation. Bilateral SI joints nontender to palpation. Bilateral trochanteric flares nontender to palpation. Bilateral patellar and Achilles reflexes were 2/4 and symmetrical.  No clonus was noted bilaterally. Bilateral lower extremity strength was 5/5 in all muscle groups. Sensation intact to light touch in L3 thru S1 dermatomes bilaterally. Negative straight leg raise bilaterally. Negative Devon's and Gaenslen's test bilaterally. Imaging  Study Result    Narrative & Impression   MRI LUMBAR SPINE WITHOUT CONTRAST     INDICATION: M54.42: Lumbago with sciatica, left side  G89.29: Other chronic pain. COMPARISON: MRI lumbar spine 10/16/2006     TECHNIQUE:  Multiplanar, multisequence imaging of the lumbar spine was performed. .        IMAGE QUALITY:  Diagnostic     FINDINGS:     VERTEBRAL BODIES: Transitional lumbosacral anatomy is noted with a partially lumbarized S1 vertebral body with a rudimentary disc space. If surgical intervention is contemplated then radiographic correlation of the spinal column is recommended for   accurate numbering. Stable grade 1 degenerative anterolisthesis is noted at the L5-S1 level. No compression fracture. Stable mild marrow edema within the bilateral L4 and L5 pedicles, likely degenerative. SACRUM:  Normal signal within the sacrum. No evidence of insufficiency or stress fracture.      DISTAL CORD AND CONUS:  Normal size and signal within the distal cord and conus. The conus terminates at the L1-2 level. The cauda equina nerve roots appear within normal limits. PARASPINAL SOFT TISSUES:  Paraspinal soft tissues are unremarkable. LOWER THORACIC DISC SPACES:  Normal disc height and signal.  No disc herniation, canal stenosis or foraminal narrowing. LUMBAR DISC SPACES:     L1-2: No focal disc herniation, central canal stenosis, or neural foraminal narrowing. L2-3: New small right foraminal disc protrusion. No central canal or  subarticular/lateral recess narrowing. Mild right neural foraminal stenosis. L3-4: Mild diffuse disc bulge with new small superimposed bilateral foraminal disc protrusions. No central canal or  subarticular/lateral recess narrowing. Mild bilateral neural foraminal stenosis. L4-5: No focal disc herniation. No central canal or  subarticular/lateral recess narrowing. Mild bilateral neural foraminal stenosis. L5-S1: Grade 1 degenerative anterolisthesis with secondary unroofing of the posterior disc and diffuse disc bulge. Bilateral facet hypertrophy. No central canal or  subarticular/lateral recess narrowing. Moderate right neural foraminal stenosis with   encroachment of the exiting right L5 nerve root. OTHER FINDINGS: Partially imaged 2.2 cm right midpole renal cyst     IMPRESSION:     New grade 1 degenerative anterolisthesis is noted at the L5-S1 level with secondary disc bulge. There is again moderate right L5-S1 neural foraminal narrowing with encroachment of the traversing right L5 nerve root. New mild edema within the bilateral L4 and L5 pedicles, likely degenerative. Mild multilevel degenerative disc disease as detailed. New foraminal disc protrusions are noted at the L2-3 and L3-4 levels.      Workstation performed: [id number]

## 2024-02-19 ENCOUNTER — OFFICE VISIT (OUTPATIENT)
Dept: FAMILY MEDICINE CLINIC | Facility: CLINIC | Age: 54
End: 2024-02-19
Payer: COMMERCIAL

## 2024-02-19 VITALS
DIASTOLIC BLOOD PRESSURE: 84 MMHG | SYSTOLIC BLOOD PRESSURE: 144 MMHG | BODY MASS INDEX: 24.48 KG/M2 | HEIGHT: 70 IN | RESPIRATION RATE: 16 BRPM | OXYGEN SATURATION: 95 % | HEART RATE: 77 BPM | WEIGHT: 171 LBS | TEMPERATURE: 97.8 F

## 2024-02-19 DIAGNOSIS — N40.1 BENIGN PROSTATIC HYPERPLASIA WITH URINARY HESITANCY: ICD-10-CM

## 2024-02-19 DIAGNOSIS — M47.816 LUMBAR SPONDYLOSIS: ICD-10-CM

## 2024-02-19 DIAGNOSIS — R39.11 BENIGN PROSTATIC HYPERPLASIA WITH URINARY HESITANCY: ICD-10-CM

## 2024-02-19 DIAGNOSIS — R35.0 URINARY FREQUENCY: ICD-10-CM

## 2024-02-19 DIAGNOSIS — Z00.00 ENCOUNTER FOR WELLNESS EXAMINATION IN ADULT: Primary | ICD-10-CM

## 2024-02-19 LAB
BACTERIA UR QL AUTO: ABNORMAL /HPF
BILIRUB UR QL STRIP: NEGATIVE
CLARITY UR: CLEAR
COLOR UR: ABNORMAL
GLUCOSE UR STRIP-MCNC: NEGATIVE MG/DL
HGB UR QL STRIP.AUTO: ABNORMAL
KETONES UR STRIP-MCNC: NEGATIVE MG/DL
LEUKOCYTE ESTERASE UR QL STRIP: NEGATIVE
NITRITE UR QL STRIP: NEGATIVE
NON-SQ EPI CELLS URNS QL MICRO: ABNORMAL /HPF
PH UR STRIP.AUTO: 6 [PH]
PROT UR STRIP-MCNC: ABNORMAL MG/DL
RBC #/AREA URNS AUTO: ABNORMAL /HPF
SL AMB  POCT GLUCOSE, UA: ABNORMAL
SL AMB LEUKOCYTE ESTERASE,UA: ABNORMAL
SL AMB POCT BILIRUBIN,UA: ABNORMAL
SL AMB POCT BLOOD,UA: ABNORMAL
SL AMB POCT CLARITY,UA: CLEAR
SL AMB POCT COLOR,UA: YELLOW
SL AMB POCT KETONES,UA: ABNORMAL
SL AMB POCT NITRITE,UA: ABNORMAL
SL AMB POCT PH,UA: 6
SL AMB POCT SPECIFIC GRAVITY,UA: 1.01
SL AMB POCT URINE PROTEIN: ABNORMAL
SL AMB POCT UROBILINOGEN: ABNORMAL
SP GR UR STRIP.AUTO: 1.01 (ref 1–1.03)
UROBILINOGEN UR STRIP-ACNC: <2 MG/DL
WBC #/AREA URNS AUTO: ABNORMAL /HPF

## 2024-02-19 PROCEDURE — 81002 URINALYSIS NONAUTO W/O SCOPE: CPT | Performed by: FAMILY MEDICINE

## 2024-02-19 PROCEDURE — 99396 PREV VISIT EST AGE 40-64: CPT | Performed by: FAMILY MEDICINE

## 2024-02-19 PROCEDURE — 81001 URINALYSIS AUTO W/SCOPE: CPT | Performed by: FAMILY MEDICINE

## 2024-02-19 PROCEDURE — 87086 URINE CULTURE/COLONY COUNT: CPT | Performed by: FAMILY MEDICINE

## 2024-02-19 RX ORDER — DOXAZOSIN MESYLATE 4 MG/1
4 TABLET ORAL
Qty: 90 TABLET | Refills: 3 | Status: SHIPPED | OUTPATIENT
Start: 2024-02-19

## 2024-02-19 RX ORDER — DOXAZOSIN 2 MG/1
2 TABLET ORAL EVERY MORNING
Qty: 90 TABLET | Refills: 3 | Status: SHIPPED | OUTPATIENT
Start: 2024-02-19 | End: 2024-02-20 | Stop reason: SDUPTHER

## 2024-02-19 NOTE — PROGRESS NOTES
Name: Bhavik Corado      : 1970      MRN: [id number]  Encounter Provider: Karen Yanez MD  Encounter Date: 2024   Encounter department: [facility]    Assessment & Plan     1. Encounter for wellness examination in adult  Comments:  Patient is up-to-date with health screenings including PSA, colonoscopy and blood work    2. Benign prostatic hyperplasia with urinary hesitancy  Assessment & Plan:  Chronic symptoms of urinary frequency and hesitancy  Recent evaluation with kidney/bladder ultrasound 2023: Normal appearance of kidneys, small right simple cyst, no hydronephrosis, no significant postvoid residual, mild prostatomegaly  Patient remains on Cardura 4 mg nightly for treatment of BPH   I suspect there is component of overactive bladder.  I recommend reevaluation by urology.  The time being we will increase dose of Cardura from 4 mg nightly to 2 mg in the morning and 4 mg at night    Orders:  -     doxazosin (CARDURA) 4 mg tablet; Take 1 tablet (4 mg total) by mouth daily at bedtime    3. Urinary frequency  -     POCT urine dip  -     Urine culture; Future  -     Urinalysis with microscopic; Future  -     Urine culture  -     Urinalysis with microscopic    4. Lumbar spondylosis  Assessment & Plan:  Low back pain has improved  Patient was eval by [facility] spine and pain center.  Will follow-up PRN.  Start home PT with core strengthening exercises for treatment of chronic low back spasm             Subjective     The patient presents for annual well exam.    Health screenings    Prostate : PSA -0.51September   Colorectal: up-to-date, colonoscopy 2021 due in 10 years  Blood work: 2023, unremarkable    Ongoing urinary symptoms.  Patient reports symptoms of hesitancy with urination taking up to 5 seconds.  He describes intermittent symptoms of incomplete emptying .He suspects possible incomplete emptying but ultrasound did not reveal any  increased postvoid volume.Patient was recently evaluated by urology, no new recommendations.  He remains on Cardura and is wondering if dose could be be increased    Low back pain symptoms have improved.  Patient was evaluated by spine and pain center.    We discussed results of recent imaging studies        Review of Systems   Constitutional: Negative.    HENT: Negative.     Eyes: Negative.    Respiratory: Negative.     Cardiovascular: Negative.    Gastrointestinal: Negative.    Endocrine: Negative.    Genitourinary:         As per HPI   Musculoskeletal: Negative.    Skin: Negative.    Allergic/Immunologic: Negative.    Neurological: Negative.    Hematological: Negative.    Psychiatric/Behavioral: Negative.         Past Medical History:   Diagnosis Date   • Allergic April   • Arthritis April   • Hypertension    • Shingles December   • Swelling of finger joint of right hand     Right 3rd digit   • Thoracic outlet syndrome      Past Surgical History:   Procedure Laterality Date   • COLONOSCOPY     • HERNIA REPAIR     • TONSILLECTOMY       Family History   Problem Relation Age of Onset   • Cancer Mother         Bladder   • Hypertension Mother    • Skin cancer Father    • Arthritis Father    • Diabetes Brother    • Rheum arthritis Brother    • Hypertension Brother    • Diabetes Brother    • Arthritis Brother    • Arthritis Maternal Grandmother    • Arthritis Paternal Uncle      Social History     Socioeconomic History   • Marital status: /Civil Union     Spouse name: None   • Number of children: None   • Years of education: None   • Highest education level: None   Occupational History   • None   Tobacco Use   • Smoking status: Never   • Smokeless tobacco: Never   Vaping Use   • Vaping status: Never Used   Substance and Sexual Activity   • Alcohol use: Yes     Comment: Social   • Drug use: No   • Sexual activity: Yes     Partners: Female     Birth control/protection: None   Other Topics Concern   • None   Social  "History Narrative   • None     Social Determinants of Health     Financial Resource Strain: Not on file   Food Insecurity: Not on file   Transportation Needs: Not on file   Physical Activity: Not on file   Stress: Not on file   Social Connections: Not on file   Intimate Partner Violence: Not on file   Housing Stability: Not on file     Current Outpatient Medications on File Prior to Visit   Medication Sig   • Docusate Sodium (STOOL SOFTENER LAXATIVE PO) Take by mouth   • Magnesium 100 MG CAPS Take by mouth   • Multiple Vitamin (MULTIVITAMINS PO) Take 1 capsule by mouth daily     No Known Allergies    There is no immunization history on file for this patient.    Objective     /84   Pulse 77   Temp 97.8 °F (36.6 °C) (Temporal)   Resp 16   Ht 5' 10\" (1.778 m)   Wt 77.6 kg (171 lb)   SpO2 95%   BMI 24.54 kg/m²   Vitals:    02/19/24 1059 02/19/24 1212   BP: 150/82 144/84   BP Location: Left arm    Patient Position: Sitting    Cuff Size: Standard    Pulse: 77    Resp: 16    Temp: 97.8 °F (36.6 °C)    TempSrc: Temporal    SpO2: 95%    Weight: 77.6 kg (171 lb)    Height: 5' 10\" (1.778 m)        Physical Exam  Vitals and nursing note reviewed.   Constitutional:       General: He is not in acute distress.     Appearance: Normal appearance. He is well-developed. He is not ill-appearing.   HENT:      Head: Normocephalic and atraumatic.   Eyes:      Conjunctiva/sclera: Conjunctivae normal.   Neck:      Vascular: No carotid bruit.   Cardiovascular:      Rate and Rhythm: Normal rate and regular rhythm.      Heart sounds: Normal heart sounds. No murmur heard.  Pulmonary:      Effort: Pulmonary effort is normal. No respiratory distress.      Breath sounds: Normal breath sounds. No wheezing or rales.   Abdominal:      General: Bowel sounds are normal. There is no distension or abdominal bruit.      Palpations: Abdomen is soft.   Musculoskeletal:         General: Normal range of motion.      Cervical back: Neck supple.    "   Right lower leg: No edema.      Left lower leg: No edema.   Skin:     General: Skin is warm.   Neurological:      General: No focal deficit present.      Mental Status: He is alert and oriented to person, place, and time.      Cranial Nerves: No cranial nerve deficit.   Psychiatric:         Mood and Affect: Mood normal.         Behavior: Behavior normal.       Karen Yanez MD

## 2024-02-20 ENCOUNTER — PATIENT MESSAGE (OUTPATIENT)
Dept: FAMILY MEDICINE CLINIC | Facility: CLINIC | Age: 54
End: 2024-02-20

## 2024-02-20 DIAGNOSIS — N40.0 BENIGN PROSTATIC HYPERPLASIA WITHOUT LOWER URINARY TRACT SYMPTOMS: ICD-10-CM

## 2024-02-20 LAB — BACTERIA UR CULT: NORMAL

## 2024-02-20 RX ORDER — DOXAZOSIN 2 MG/1
2 TABLET ORAL EVERY MORNING
Qty: 30 TABLET | Refills: 0 | Status: SHIPPED | OUTPATIENT
Start: 2024-02-20

## 2024-02-21 PROBLEM — N40.1 BENIGN PROSTATIC HYPERPLASIA WITH URINARY HESITANCY: Status: ACTIVE | Noted: 2018-01-15

## 2024-02-21 PROBLEM — R39.11 BENIGN PROSTATIC HYPERPLASIA WITH URINARY HESITANCY: Status: ACTIVE | Noted: 2018-01-15

## 2024-02-21 PROBLEM — M25.441 SWELLING OF FINGER JOINT OF RIGHT HAND: Status: RESOLVED | Noted: 2020-06-26 | Resolved: 2024-02-21

## 2024-02-21 PROBLEM — G54.0 THORACIC OUTLET SYNDROME: Status: RESOLVED | Noted: 2019-10-07 | Resolved: 2024-02-21

## 2024-02-22 NOTE — ASSESSMENT & PLAN NOTE
Chronic symptoms of urinary frequency and hesitancy  Recent evaluation with kidney/bladder ultrasound September 2023: Normal appearance of kidneys, small right simple cyst, no hydronephrosis, no significant postvoid residual, mild prostatomegaly  Patient remains on Cardura 4 mg nightly for treatment of BPH   I suspect there is component of overactive bladder.  I recommend reevaluation by urology.  The time being we will increase dose of Cardura from 4 mg nightly to 2 mg in the morning and 4 mg at night

## 2024-02-22 NOTE — ASSESSMENT & PLAN NOTE
Low back pain has improved  Patient was eval by [facility] spine and pain center.  Will follow-up PRN.  Start home PT with core strengthening exercises for treatment of chronic low back spasm

## 2024-04-29 ENCOUNTER — NURSE TRIAGE (OUTPATIENT)
Age: 54
End: 2024-04-29

## 2024-04-29 ENCOUNTER — TELEPHONE (OUTPATIENT)
Age: 54
End: 2024-04-29

## 2024-04-29 NOTE — TELEPHONE ENCOUNTER
Caller: patient    Doctor: JAVID    Reason for call: patient has been feeling off balance and dizzy and would like to know if that is part of his back pain    Call back#:

## 2024-04-29 NOTE — TELEPHONE ENCOUNTER
"Patient contacted the office this afternoon after speaking with Pain & Spine provider, reporting ongoing dizziness for ~2 weeks. Patient states \"it feels like I've had drink,\" sensation worsens with movement/change in position but can be felt at rest. Patient denies history of vertigo and all other neurological symptoms at this time. Advised of recommendation to be seen within 3 days in the office, requesting appointment with PCP. Only same-day appointments available for the remainder of the week, advised patient other recommendation would be to present to  to be seen today. Patient declined at this time, reports he would rather be seen at the office and plans to call tomorrow to check for same-day afternoon appointment. Advised patient if dizziness worsens or neurological signs (unilateral weakness, numbness, facial or speech change) develop, to present to closest ED for further evaluation. Patient verbalized understanding.    Reason for Disposition   MILD dizziness (e.g., walking normally) AND has NOT been evaluated by physician for this (Exception: dizziness caused by heat exposure, sudden standing, or poor fluid intake)    Answer Assessment - Initial Assessment Questions  1. DESCRIPTION: \"Describe your dizziness.\"      \"Feels like I've had a drink even though I haven't.\" Very slight distant/lack of focus, dizziness  2. LIGHTHEADED: \"Do you feel lightheaded?\" (e.g., somewhat faint, woozy, weak upon standing)      Denies  3. VERTIGO: \"Do you feel like either you or the room is spinning or tilting?\" (i.e. vertigo)      No previous diagnosis of vertigo, says somewhat but not usually independent of his movement  4. SEVERITY: \"How bad is it?\"  \"Do you feel like you are going to faint?\" \"Can you stand and walk?\"    - MILD: Feels slightly dizzy, but walking normally.    - MODERATE: Feels very unsteady when walking, but not falling; interferes with normal activities (e.g., school, work) .    - SEVERE: Unable to walk " "without falling, or requires assistance to walk without falling; feels like passing out now.       Mild  5. ONSET:  \"When did the dizziness begin?\"      2 weeks ago   6. AGGRAVATING FACTORS: \"Does anything make it worse?\" (e.g., standing, change in head position)      Movement, changes in position  7. HEART RATE: \"Can you tell me your heart rate?\" \"How many beats in 15 seconds?\"  (Note: not all patients can do this)        States he has a BP monitor, readings as high as 165/95. Baseline reportedly 140/85.  8. CAUSE: \"What do you think is causing the dizziness?\"      Unsure  9. RECURRENT SYMPTOM: \"Have you had dizziness before?\" If Yes, ask: \"When was the last time?\" \"What happened that time?\"      Yes, a long time ago, \"when I would squat down to turn the water on in the shower, it felt similar\"  10. OTHER SYMPTOMS: \"Do you have any other symptoms?\" (e.g., fever, chest pain, vomiting, diarrhea, bleeding)        Patient states he has history of panic attacks, feels somewhat similar. \"Jittery, shaky.\" Reports some changes to bowel habits, denies diarrhera/vomiting.    Protocols used: Dizziness-ADULT-[place]    "

## 2024-04-29 NOTE — TELEPHONE ENCOUNTER
Had a long discussion with the client in regards to his symptoms. He stated the symptoms started about two weeks ago and he has thought numerous things including sinusitis and cardiac issues d/t  medication changes. He is not prescribed any medications with us. He stated he was concerned because of his overall nerves in his body and he thought it could be affecting his head. He stated he was was watching TV last night and he felt like his whole body was quivering like he had the chills. He denied a fever.  Inquired to see if he had a neurologist and he stated he probably does. Then he stated he has had bladder issues previously and he had read in the [facility] on call that all of these symptoms could be coming from his back. Inquired about loss of B & B? He denies and reviewed Cuada equina and he stated is that something that could happen and reviewed that is an emergent situation. He stated sometimes when he has a BM, he has an electrical sensation that radiates through his rectum to his LB. Reviewed his MRI findings with him and sometimes depending on certain movements can cause certain sensations, but right now clarified that he has no pain, then he stated he did have a lot of sciatic pain about a year ago but then reviewed that currently he has no pain. As we went off subject. But because of his current symptoms, would refer to his PCP for further workup or he should contact his neurologist. 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 Inquired about facial drooping or one side feeling more weak than the other and he stated no. Inquired about ever being diagnosed with vertigo and he stated no. Reviewed that at this point he should contact his PCP or if the symptoms increase to seek treatment at the ED to R/O CVA. He appreciated the call

## 2024-04-29 NOTE — TELEPHONE ENCOUNTER
Please schedule soonest available with any physician/NP.  Okay to use same-day appointment on my schedule for tomorrow.  Advise urgent care center/ER evaluation if symptoms worsen in the interim  Thank you

## 2024-04-30 ENCOUNTER — NURSE TRIAGE (OUTPATIENT)
Age: 54
End: 2024-04-30

## 2024-04-30 ENCOUNTER — OFFICE VISIT (OUTPATIENT)
Dept: FAMILY MEDICINE CLINIC | Facility: CLINIC | Age: 54
End: 2024-04-30
Payer: COMMERCIAL

## 2024-04-30 VITALS
SYSTOLIC BLOOD PRESSURE: 150 MMHG | OXYGEN SATURATION: 96 % | HEART RATE: 79 BPM | TEMPERATURE: 98.4 F | WEIGHT: 165.6 LBS | HEIGHT: 70 IN | DIASTOLIC BLOOD PRESSURE: 72 MMHG | BODY MASS INDEX: 23.71 KG/M2 | RESPIRATION RATE: 16 BRPM

## 2024-04-30 DIAGNOSIS — H81.13 BENIGN PAROXYSMAL POSITIONAL VERTIGO DUE TO BILATERAL VESTIBULAR DISORDER: Primary | ICD-10-CM

## 2024-04-30 PROCEDURE — 3725F SCREEN DEPRESSION PERFORMED: CPT | Performed by: FAMILY MEDICINE

## 2024-04-30 PROCEDURE — 99213 OFFICE O/P EST LOW 20 MIN: CPT | Performed by: FAMILY MEDICINE

## 2024-04-30 RX ORDER — MECLIZINE HCL 12.5 MG/1
12.5 TABLET ORAL 3 TIMES DAILY PRN
Qty: 30 TABLET | Refills: 0 | Status: SHIPPED | OUTPATIENT
Start: 2024-04-30

## 2024-04-30 NOTE — TELEPHONE ENCOUNTER
Regarding: dizziness  ----- Message from Sridevi Bird sent at 4/29/2024 11:59 AM EDT -----  Patient called asking to speak with the doctor or a nurse. He has been feeling dizzy for a few weeks now. States that he feels like he's had a drink or two and just doesn't feel right. He was told by someone else that he could have a bout of vertigo and he should contact pcp.

## 2024-04-30 NOTE — PROGRESS NOTES
Name: Bhavik Corado      : 1970      MRN: [id number]  Encounter Provider: Karen Yanez MD  Encounter Date: 2024   Encounter department: [facility]    Assessment & Plan     1. Benign paroxysmal positional vertigo due to bilateral vestibular disorder  -     Ambulatory referral to Physical Therapy; Future  -     meclizine (ANTIVERT) 12.5 MG tablet; Take 1 tablet (12.5 mg total) by mouth 3 (three) times a day as needed for dizziness       Presents for evaluation of benign positional vertigo.  Start meclizine.  Restart Nasacort.  Referral to vestibular PT.  Patient will contact me if symptoms are not improving.Blood pressure is mildly elevated.  I recommend to restart an additional 2 mg of Cardura for the total dose of 6 mg daily.  Patient will start monitoring blood pressures at home and will update me in 10 to 14 days.    Return if symptoms worsen or fail to improve.      Subjective     Dizziness/ lightheadedness  2-3 weeks  Denies any acute symptoms of spinning but feels ongoing unsteadiness.  No preceding URI symptoms or fever.  Admits to symptoms of seasonal allergies and nasal congestion.  Patient noticed lightheadedness after bearing for bowel movement.  He denies any acute headaches but admits to mild occipital pressure at first, it has resolved.  No new medications.  Patient has been using Cardura for treatment of BPH and borderline BPs.  We have increased dose from 4 mg to 6 mg almost 2 months ago.  Patient has been tolerating new dose of medication well with no side effects.    No complaints of chest pain, palpitations, shortness of breath.  Dizziness is worse with movement and improves at rest.             Dizziness  Pertinent negatives include no chest pain, headaches or numbness.     Review of Systems   Constitutional: Negative.    HENT:  Positive for tinnitus.    Eyes:  Negative for visual disturbance.   Respiratory:  Negative for chest tightness and shortness of  breath.    Cardiovascular:  Negative for chest pain and palpitations.   Neurological:  Positive for dizziness and light-headedness. Negative for numbness and headaches.       Past Medical History:   Diagnosis Date   • Allergic April   • Arthritis April   • Hypertension    • Shingles December   • Swelling of finger joint of right hand     Right 3rd digit   • Thoracic outlet syndrome      Past Surgical History:   Procedure Laterality Date   • COLONOSCOPY     • HERNIA REPAIR     • TONSILLECTOMY       Family History   Problem Relation Age of Onset   • Cancer Mother         Bladder   • Hypertension Mother    • Skin cancer Father    • Arthritis Father    • Diabetes Brother    • Rheum arthritis Brother    • Hypertension Brother    • Diabetes Brother    • Arthritis Brother    • Arthritis Maternal Grandmother    • Arthritis Paternal Uncle      Social History     Socioeconomic History   • Marital status: /Civil Union     Spouse name: None   • Number of children: None   • Years of education: None   • Highest education level: None   Occupational History   • None   Tobacco Use   • Smoking status: Never   • Smokeless tobacco: Never   Vaping Use   • Vaping status: Never Used   Substance and Sexual Activity   • Alcohol use: Yes     Comment: Social   • Drug use: No   • Sexual activity: Yes     Partners: Female     Birth control/protection: None   Other Topics Concern   • None   Social History Narrative   • None     Social Determinants of Health     Financial Resource Strain: Not on file   Food Insecurity: Not on file   Transportation Needs: Not on file   Physical Activity: Not on file   Stress: Not on file   Social Connections: Not on file   Intimate Partner Violence: Not on file   Housing Stability: Not on file     Current Outpatient Medications on File Prior to Visit   Medication Sig   • Docusate Sodium (STOOL SOFTENER LAXATIVE PO) Take by mouth   • doxazosin (CARDURA) 2 mg tablet Take 1 tablet (2 mg total) by mouth every  "morning   • doxazosin (CARDURA) 4 mg tablet Take 1 tablet (4 mg total) by mouth daily at bedtime   • Magnesium 100 MG CAPS Take by mouth   • Multiple Vitamin (MULTIVITAMINS PO) Take 1 capsule by mouth daily     No Known Allergies    There is no immunization history on file for this patient.    Objective     /72   Pulse 79   Temp 98.4 °F (36.9 °C) (Temporal)   Resp 16   Ht 5' 10\" (1.778 m)   Wt 75.1 kg (165 lb 9.6 oz)   SpO2 96%   BMI 23.76 kg/m²   Vitals:    04/30/24 1357 04/30/24 1431   BP: 142/88 150/72   BP Location: Left arm    Patient Position: Sitting    Cuff Size: Standard    Pulse: 79    Resp: 16    Temp: 98.4 °F (36.9 °C)    TempSrc: Temporal    SpO2: 96%    Weight: 75.1 kg (165 lb 9.6 oz)    Height: 5' 10\" (1.778 m)        Physical Exam  Vitals and nursing note reviewed.   Constitutional:       General: He is not in acute distress.     Appearance: Normal appearance. He is well-developed. He is not ill-appearing.   HENT:      Head: Normocephalic and atraumatic.      Right Ear: Tympanic membrane and ear canal normal.      Left Ear: Tympanic membrane and ear canal normal.      Mouth/Throat:      Mouth: Mucous membranes are moist.   Eyes:      General: No scleral icterus.     Conjunctiva/sclera: Conjunctivae normal.      Pupils: Pupils are equal, round, and reactive to light.      Comments: Gaze provoke nystagmus   Neck:      Vascular: No carotid bruit.   Cardiovascular:      Rate and Rhythm: Normal rate and regular rhythm.      Heart sounds: Normal heart sounds. No murmur heard.  Pulmonary:      Effort: Pulmonary effort is normal. No respiratory distress.      Breath sounds: Normal breath sounds. No wheezing, rhonchi or rales.   Abdominal:      General: There is no abdominal bruit.   Musculoskeletal:         General: Normal range of motion.      Cervical back: Neck supple. No rigidity.   Neurological:      General: No focal deficit present.      Mental Status: He is alert and oriented to person, " place, and time.      Cranial Nerves: No cranial nerve deficit.      Coordination: Romberg sign positive.   Psychiatric:         Mood and Affect: Mood normal.         Behavior: Behavior normal.       Karen Yanez MD

## 2024-05-06 ENCOUNTER — EVALUATION (OUTPATIENT)
Dept: PHYSICAL THERAPY | Facility: CLINIC | Age: 54
End: 2024-05-06
Payer: COMMERCIAL

## 2024-05-06 DIAGNOSIS — H81.13 BENIGN PAROXYSMAL POSITIONAL VERTIGO DUE TO BILATERAL VESTIBULAR DISORDER: ICD-10-CM

## 2024-05-06 PROCEDURE — 97112 NEUROMUSCULAR REEDUCATION: CPT

## 2024-05-06 PROCEDURE — 97162 PT EVAL MOD COMPLEX 30 MIN: CPT

## 2024-05-06 NOTE — PROGRESS NOTES
PT Evaluation     Today's date: 2024  Patient name: Bhavik Corado  : 1970  MRN: [id number]  Referring provider: Karen Yanez MD  Dx:   Encounter Diagnosis     ICD-10-CM    1. Benign paroxysmal positional vertigo due to bilateral vestibular disorder  H81.13 Ambulatory referral to Physical Therapy                     Assessment  Assessment details: Bhavik Corado is a pleasant 53 y.o. male who presents with onset of dizziness and imbalance symptoms that started approximately 3 weeks ago. Quick movements of his head and body seem to increase his symptoms. Due to this, he occasionally feels off-balance while walking.    PT examination findings include: Dizziness Handicap Index score of 18/100 indicating mild self-perceived impairment; symptoms with all cervical mobility; abnormal convergence of 10 cm; positive DVA testing of 3 lines difference between static and dynamic head, and difficulty with sensory integration given symptoms with any eyes closed condition of MCTSIB. These findings are consistent with impaired vestibulo-ocular reflex and resultant intolerance to vestibular stimuli.     Initial HEP of gaze stabilization was provided with appropriate symptoms provoked both with head turns and nods. He was provided written instructions and educated on the importance of frequency and consistency of exercises to help retrain his vestibular system. He verbalized understanding. The patient would benefit from skilled physical therapy to provide canalith repositioning, exercises, neuromuscular reeducation, manual techniques, gait training, and modalities as deemed necessary in order to help him achieve his goals and decrease his dizziness symptoms with all activities.       Impairments: activity intolerance, impaired balance and lacks appropriate home exercise program  Other impairment: dizziness/lightheadedness  Understanding of Dx/Px/POC: good   Prognosis: good    Goals  STGs in 4 weeks  1. Patient  will be independent with HEP (for VOR, movement deficits, and/or balance as needed).  2. Patient will be able to perform all cervical ROM without provocation of symptoms.    LTGs in 8 weeks  1. Patient will have no more than 2 lines difference in DVA.  2. Patient will improve score on Dizziness Handicap Index by at least 17% for decreased perception of disability (17% is minimal detectable change).  3. Patient will complete MCTSIB with no symptoms for improved vestibular integration.        Plan  Plan details: Due to schedule, he wants to start with 1x/week.  Patient would benefit from: PT eval and skilled physical therapy  Planned therapy interventions: balance, canalith repositioning, neuromuscular re-education, patient education, postural training, strengthening, stretching, therapeutic activities, therapeutic exercise, graded activity, graded exercise and home exercise program  Frequency: 2x week  Duration in weeks: 8  Plan of Care beginning date: 5/6/2024  Plan of Care expiration date: 7/6/2024  Treatment plan discussed with: patient        Subjective Evaluation    History of Present Illness  Mechanism of injury: Bhavik states that he has been experiencing lightheadedness and dizziness symptoms for about 3 weeks. He finds that if he is focused on something--he barely notices it. If he thinks about the symptoms, that's when he notices it the most. If he does a lot and doing things more quickly, he will feel it a bit more. He says that it comes and goes. The duration of it can vary. His symptoms are not stopping him from doing any of the regular tasks or projects.     He describes it as being drunk--not spinning, but that the world is moving at a normal speed and he isn't. Denies any nausea or vomiting with his symptoms.     He was prescribed meclizine and has only taken it a few times. He thinks it may have helped.   Patient Goals  Patient goal: not be dizzy; get back to normal  Pain  Current pain rating:  1  Location: spine          Objective    Dysequilibrium: Yes  Lightheadedness: No  Vertigo: No  Rocking or Swaying: Yes         Oscillopsia: No  Diplopia: No  Motion sickness: Yes--developed it a few years ago  Floating, Swimming, Disconnected: Yes    Exacerbation Factors:  Bending over: Sometimes  Turning Head: Sometimes  Rolling in bed: No  Walking: No  Looking up: Sometimes  Supine to/from sitting: Yes  Walking in busy environment: Yes    Duration of Symptoms: variable     Concurrent Complaints:  Tinnitus:No  Aural Fullness:No  Known hearing loss:No  Nausea, Vomiting: No  Altered Vision: No  Poor Concentration: Yes  Memory Loss: No  Peripheral Neuropathy: Sometimes  Cervical Pain: Yes   Headache: No      PHYSICAL FINDINGS:  Oculomotor ROM: WNL  Resting nystagmus: No  Gaze holding nystagmus No   Smooth pursuit Normal--minor sense of lagging    Vertical Saccades:Normal  Horizontal Saccades:Normal  Convergence: Abnormal (10 cm)    Cover/Uncover/Crosscover Test: Normal    VOR: normal    Head thrust (room light): Normal    Dynamic Visual Acuity: inadequate (3 lines difference)  Dynamic Head: 20/40  Static Head: 20/20      MCTSIB  30 sec (no sway), eyes open firm surface   30 sec (minor sway--with sxs), eyes closed firm surface  30 sec (minor sway), eyes open foam surface  30 sec (moderate sway--with sxs), eyes closed foam surface      DHI: score 18/100  0-30 mild , 30-60 moderate,  severe disability      Positional testing: Left Right   [place] Negative for symptoms and nystagmus Negative for symptoms and nystagmus   Roll test: Negative for symptoms and nystagmus Negative for symptoms and nystagmus       Cervical ROM:  WNL--minor symptoms with all motion       Short Term Goal Expiration Date:(6/6/2024)  Long Term Goal Expiration Date: (7/6/2024)  POC Expiration Date: (7/6/2024)      POC expires Unit limit Auth Expiration date PT/OT/ST + Visit Limit?   7/6/24 BOMN N/a BOMN                          "    Visit/Unit Tracking  AUTH Status:  Date 5/6             BOMN Used 1 IE              To PN  Of 10                   Precautions n/a       Manuals 5/6                       Pt education Various causes of dizziness; exam findings; POC               Neuro Re-Ed         Vor x1 viewing Stand, plain background    HT x30\" ^ sxs  HN x30\" ^ sxs       Walk with HT/HN        Quick turns        Pick objects from floor        Foam                        Ther Ex                                                                        Ther Activity                        Gait Training                        Modalities                           Access Code: [email or web address]  URL: [email or web address]  Date: 05/06/2024  Prepared by: Radha Ruiz    Exercises  - Standing Gaze Stabilization with Head Rotation  - 4 x daily - 7 x weekly - 2 min hold  - Standing Gaze Stabilization with Head Nod  - 4 x daily - 7 x weekly - 2 min hold            "

## 2024-05-13 ENCOUNTER — OFFICE VISIT (OUTPATIENT)
Dept: PHYSICAL THERAPY | Facility: CLINIC | Age: 54
End: 2024-05-13
Payer: COMMERCIAL

## 2024-05-13 DIAGNOSIS — H81.13 BENIGN PAROXYSMAL POSITIONAL VERTIGO DUE TO BILATERAL VESTIBULAR DISORDER: Primary | ICD-10-CM

## 2024-05-13 PROCEDURE — 97112 NEUROMUSCULAR REEDUCATION: CPT

## 2024-05-13 PROCEDURE — 97110 THERAPEUTIC EXERCISES: CPT

## 2024-05-13 NOTE — PROGRESS NOTES
Daily Note     Today's date: 2024  Patient name: Bhavik Corado  : 1970  MRN: [id number]  Referring provider: Karen Yanez MD  Dx:   Encounter Diagnosis     ICD-10-CM    1. Benign paroxysmal positional vertigo due to bilateral vestibular disorder  H81.13                      Subjective: Bhavik states that he has been doing the exercises at home and does not notice symptom during, but feels them afterwards. He had a really good day Wednesday without noticing much symptoms but Friday, stressful day at work and since then, his symptoms have been more noticeable.       Objective: See treatment diary below      Assessment: Bhavik tolerated treatment well. He continues to have dizziness and imbalance symptoms throughout gaze stabilization and vestibular integration exercises. Most symptoms seem to present once exercise is completed and described as his eyes and head are catching up. He had more symptoms with VOR x1 viewing with head turns compared to nods. His postural sway and path deviation did seem to lessen with repetition within session. He was encouraged to continue HEP from last session with instructions to incorporate walking with head movements throughout mobility at home and work. He verbalized understanding. Plan to progress gaze stabilization and vestibular integration to address his symptoms with function.      Plan: Continue per plan of care.  Progress treatment as tolerated.       Short Term Goal Expiration Date:(2024)  Long Term Goal Expiration Date: (2024)  POC Expiration Date: (2024)      POC expires Unit limit Auth Expiration date PT/OT/ST + Visit Limit?   24 BOMN N/a BOMN                             Visit/Unit Tracking  AUTH Status:  Date             BOMN Used 1 IE 2             To PN  Of 10 Of 10                  Precautions n/a       Manuals                       Pt education Various causes of dizziness; exam findings; POC               Neuro Re-Ed      "    Vor x1 viewing Stand, plain background    HT x30\" ^ sxs  HN x30\" ^ sxs Stand, plain background    HT x30\" ^ sxs  HN x30\" ^ sxs      Walk with HT/HN  3/4 hallway    X3 laps ea    More sxs with HT      Quick turns  3/4 hallway    360* and 180* turns    X3 laps      Pick objects from floor  5 cones in 1/2 hallway    X3 laps      Foam  FA EO with HT/HN 2x30\" ea     FT EC 3x30\" ea                        Ther Ex        C/s SNAGs        Postural strengthening  Green TB rows/pull downs x20 ea    No monies x20                                                      Ther Activity                        Gait Training                        Modalities                           Access Code: [email or web address]  URL: [email or web address]  Date: 05/06/2024  Prepared by: Radha Ruiz    Exercises  - Standing Gaze Stabilization with Head Rotation  - 4 x daily - 7 x weekly - 2 min hold  - Standing Gaze Stabilization with Head Nod  - 4 x daily - 7 x weekly - 2 min hold            "

## 2024-05-15 ENCOUNTER — TELEPHONE (OUTPATIENT)
Age: 54
End: 2024-05-15

## 2024-05-15 NOTE — TELEPHONE ENCOUNTER
Patient seen by Rodrigo at [facility]    Patient called stating he received an email about his appointment being cancelled with Dr. Boyd.  He stated he did not get a phone call why it was cancelled.  Next available appointment is in October.  He does not want to wait until then.  He will speak with his family doctor to see who else she recommends.  He wants to see an MD instead of PA for his plan of care with [facility].  He will call back to set up another appointment. He was not upset about the cancellation.

## 2024-05-20 ENCOUNTER — OFFICE VISIT (OUTPATIENT)
Dept: PHYSICAL THERAPY | Facility: CLINIC | Age: 54
End: 2024-05-20
Payer: COMMERCIAL

## 2024-05-20 DIAGNOSIS — H81.13 BENIGN PAROXYSMAL POSITIONAL VERTIGO DUE TO BILATERAL VESTIBULAR DISORDER: Primary | ICD-10-CM

## 2024-05-20 PROCEDURE — 97110 THERAPEUTIC EXERCISES: CPT

## 2024-05-20 PROCEDURE — 97112 NEUROMUSCULAR REEDUCATION: CPT

## 2024-05-20 NOTE — PROGRESS NOTES
Daily Note     Today's date: 2024  Patient name: Bahvik Corado  : 1970  MRN: [id number]  Referring provider: Karen Yanez MD  Dx:   Encounter Diagnosis     ICD-10-CM    1. Benign paroxysmal positional vertigo due to bilateral vestibular disorder  H81.13                      Subjective: Bhavik states that he has been trying to adjust his BP meds back to previous lower dosage. Unsure if that's lessened his symptom intensity or if he is just is getting used to it over the past few weeks. Still has this dizziness and off balance sense. Thinks some of the off balance may be contributing to this. Does have some dizziness getting out of bed.       Objective: See treatment diary below      Assessment: Bhavik tolerated treatment well. Head movements were introduced on treadmill with minimal path deviation observed. Given dizziness reported with getting out of bed, patrick daroffs were provided as a motion habituation exercise with minor symptoms noted upon returning to upright sitting. With hallway vestibular integration exercises, observationally he had less path deviation with comparable speed of movement. No change to symptoms of dizziness or imbalance with progressions today including backwards ambulation or busy background for VOR. Similarly, he had less postural sway with static components. With ongoing HEP and progression of vestibular system integration, hopefully self perception mirrors observational improvements.       Plan: Continue per plan of care.  Progress treatment as tolerated.       Short Term Goal Expiration Date:(2024)  Long Term Goal Expiration Date: (2024)  POC Expiration Date: (2024)      POC expires Unit limit Auth Expiration date PT/OT/ST + Visit Limit?   24 BOMN N/a BOMN                             Visit/Unit Tracking  AUTH Status:  Date            BOMN Used 1 IE 2 3             To PN  Of 10 Of 10 Of 10                 Precautions n/a       Manuals   "5/13 5/20                     Pt education Various causes of dizziness; exam findings; POC               Neuro Re-Ed         Vor x1 viewing Stand, plain background    HT x30\" ^ sxs  HN x30\" ^ sxs Stand, plain background    HT x30\" ^ sxs  HN x30\" ^ sxs Stand, busy background    HT x45\" (^ sxs)  HN x45\" (^ sxs)     Walk with HT/HN  3/4 hallway    X3 laps ea    More sxs with HT 3/4 hallway    Fwd/bwd by length    X3 laps ea     Quick turns  3/4 hallway    360* and 180* turns    X3 laps Hallway    360* and 180* turns    X3 laps     Pick objects from floor  5 cones in 1/2 hallway    X3 laps      Foam  FA EO with HT/HN 2x30\" ea     FT EC 3x30\" ea   FT EC 3x30\"    FT EO with HT/HN 2x30\" ea     Natarajan daroff   X3 rds    Minor sxs upon returning to upright             Ther Ex        C/s SNAGs        Postural strengthening  Green TB rows/pull downs x20 ea    No monies x20              TM   Warm up 4 min at 2.0 mph    Addition of HT/HN (30 sec on/off)                                     Ther Activity                        Gait Training                        Modalities                           Access Code: [email or web address]  URL: [email or web address]  Date: 05/06/2024  Prepared by: Radha Ruiz    Exercises  - Standing Gaze Stabilization with Head Rotation  - 4 x daily - 7 x weekly - 2 min hold  - Standing Gaze Stabilization with Head Nod  - 4 x daily - 7 x weekly - 2 min hold              "

## 2024-05-28 ENCOUNTER — OFFICE VISIT (OUTPATIENT)
Dept: PHYSICAL THERAPY | Facility: CLINIC | Age: 54
End: 2024-05-28
Payer: COMMERCIAL

## 2024-05-28 DIAGNOSIS — N40.0 BENIGN PROSTATIC HYPERPLASIA WITHOUT LOWER URINARY TRACT SYMPTOMS: ICD-10-CM

## 2024-05-28 DIAGNOSIS — H81.13 BENIGN PAROXYSMAL POSITIONAL VERTIGO DUE TO BILATERAL VESTIBULAR DISORDER: Primary | ICD-10-CM

## 2024-05-28 PROCEDURE — 97110 THERAPEUTIC EXERCISES: CPT

## 2024-05-28 PROCEDURE — 97112 NEUROMUSCULAR REEDUCATION: CPT

## 2024-05-28 NOTE — PROGRESS NOTES
Daily Note     Today's date: 2024  Patient name: Bhavik Corado  : 1970  MRN: [id number]  Referring provider: Karen Yanez MD  Dx:   Encounter Diagnosis     ICD-10-CM    1. Benign paroxysmal positional vertigo due to bilateral vestibular disorder  H81.13                      Subjective: Bhavik states that the dizziness seems to be ok at times. Some days it is more noticeable than others--maybe the same frequency. He says that when he was at work, he turned to look at something and felt like he lurched the other way. He did feel some of the dizziness while hiking.       Objective: See treatment diary below      Assessment: Bhavik tolerated treatment well. Initially with head movement on treadmill, he reported more sense of imbalance and dizziness symptoms that did improve some with repetition and adjustment in speed. Progress vestibular integration to include more of a balance component including foam beams and tandem gait with light UE use with subjective report of provocation of his symptoms. He had minor postural sway with eyes closed on foam but relatively within normal range and improved from previous sessions. With VOR, his symptoms onset once head movement ceases and no overall change in this over the past few sessions. He would benefit from continued skilled PT to progress his gaze stabilization and motion habituation as able and tolerated.       Plan: Continue per plan of care.      Short Term Goal Expiration Date:(2024)  Long Term Goal Expiration Date: (2024)  POC Expiration Date: (2024)      POC expires Unit limit Auth Expiration date PT/OT/ST + Visit Limit?   24 BOMN N/a BOMN                             Visit/Unit Tracking  AUTH Status:  Date           BOMN Used 1 IE 2 3  4           To PN  Of 10 Of 10 Of 10 Of 10                Precautions n/a       Manuals                     Pt education Various causes of dizziness; exam findings; POC  "              Neuro Re-Ed         Vor x1 viewing Stand, plain background    HT x30\" ^ sxs  HN x30\" ^ sxs Stand, plain background    HT x30\" ^ sxs  HN x30\" ^ sxs Stand, busy background    HT x45\" (^ sxs)  HN x45\" (^ sxs) Stand plain background    HT x45\" (^ sxs post)  HN x45\" (^ sxs post)    Walk with HT/HN  3/4 hallway    X3 laps ea    More sxs with HT 3/4 hallway    Fwd/bwd by length    X3 laps ea //bars (long)  UE prn    Tandem with HT x3 laps    Quick turns  3/4 hallway    360* and 180* turns    X3 laps Hallway    360* and 180* turns    X3 laps     Pick objects from floor  5 cones in 1/2 hallway    X3 laps  Blaze pods (2 in front on floor, 2 on mat behind)  1 min x2 rds  (1 rd rotate without moving ft, 1 rd turning feet)    Rd 1: 34 hits  Rd 2: 25 hits    Minor symptoms once task completed    Foam  FA EO with HT/HN 2x30\" ea     FT EC 3x30\" ea   FT EC 3x30\"    FT EO with HT/HN 2x30\" ea //bars (long)  UE prn  Foam beams lat with HN x3 laps    FT EC 3x30\"    Natarajan daroff   X3 rds    Minor sxs upon returning to upright     Wobble board    AP/ML with UE prn    2x30 reps ea    Ther Ex        C/s SNAGs        Postural strengthening  Green TB rows/pull downs x20 ea    No monies x20  Green TB rows/pull downs x20 ea    Pink TB no monies x20            TM   Warm up 4 min at 2.0 mph    Addition of HT/HN (30 sec on/off) Warm up 4 min at 2.0 mph    Addition of HT/HN (30 sec on/off) x3 rds (inc speed to 3.0 mph for last rd)    Total 10 min                                    Ther Activity                        Gait Training                        Modalities                           Access Code: [email or web address]  URL: [email or web address]  Date: 05/06/2024  Prepared by: Radah Ruiz    Exercises  - Standing Gaze Stabilization with Head Rotation  - 4 x daily - 7 x weekly - 2 min hold  - Standing Gaze Stabilization with Head Nod  - 4 x daily - 7 x weekly - 2 min hold                "

## 2024-05-30 RX ORDER — DOXAZOSIN 2 MG/1
2 TABLET ORAL EVERY MORNING
Qty: 90 TABLET | Refills: 1 | Status: SHIPPED | OUTPATIENT
Start: 2024-05-30

## 2024-06-17 DIAGNOSIS — N40.0 BENIGN PROSTATIC HYPERPLASIA WITHOUT LOWER URINARY TRACT SYMPTOMS: ICD-10-CM

## 2024-06-17 RX ORDER — DOXAZOSIN 2 MG/1
2 TABLET ORAL EVERY MORNING
Qty: 90 TABLET | Refills: 1 | Status: SHIPPED | OUTPATIENT
Start: 2024-06-17

## 2024-06-17 NOTE — TELEPHONE ENCOUNTER
Pt called back and stated he needs to change pharmacy for the Doxazosin to [facility] in [place] instead of the mail order pharmacy.

## 2024-06-17 NOTE — TELEPHONE ENCOUNTER
NOT A DUPLICATE-medication (2mg dosage) recently sent to [facility] in [place]. Patient on; 4mg at bedtime  &  2mg in the morning          Reason for call:   [x] Refill   [] Prior Auth  [] Other:     Office:   [x] PCP/Provider -   [] Specialty/Provider -     Medication: Doxazosin 2mg 1 tablet daily in the morning       Pharmacy: [facility]    Does the patient have enough for 3 days?   [x] Yes   [] No - Send as HP to POD

## 2024-07-20 ENCOUNTER — APPOINTMENT (OUTPATIENT)
Dept: RADIOLOGY | Facility: CLINIC | Age: 54
End: 2024-07-20
Payer: COMMERCIAL

## 2024-07-20 ENCOUNTER — OFFICE VISIT (OUTPATIENT)
Dept: URGENT CARE | Facility: CLINIC | Age: 54
End: 2024-07-20
Payer: COMMERCIAL

## 2024-07-20 VITALS
HEART RATE: 98 BPM | SYSTOLIC BLOOD PRESSURE: 124 MMHG | OXYGEN SATURATION: 98 % | TEMPERATURE: 97.6 F | BODY MASS INDEX: 23.62 KG/M2 | HEIGHT: 70 IN | WEIGHT: 165 LBS | DIASTOLIC BLOOD PRESSURE: 82 MMHG | RESPIRATION RATE: 16 BRPM

## 2024-07-20 DIAGNOSIS — S56.419A RUPTURE OF EXTENSOR TENDON OF FINGER: Primary | ICD-10-CM

## 2024-07-20 DIAGNOSIS — S69.91XA FINGER INJURY, RIGHT, INITIAL ENCOUNTER: ICD-10-CM

## 2024-07-20 PROCEDURE — S9083 URGENT CARE CENTER GLOBAL: HCPCS

## 2024-07-20 PROCEDURE — 73130 X-RAY EXAM OF HAND: CPT

## 2024-07-20 PROCEDURE — G0382 LEV 3 HOSP TYPE B ED VISIT: HCPCS

## 2024-07-20 NOTE — PROGRESS NOTES
[facility]        NAME: Bhavik Corado is a 53 y.o. male  : 1970    MRN: [id number]  DATE: 2024  TIME: 11:07 AM    Assessment and Plan   Rupture of extensor tendon of finger [S56.419A]  1. Rupture of extensor tendon of finger  XR hand 3+ vw right    Ambulatory Referral to Orthopedic Surgery            Patient Instructions   Ice to the finger  Leave the splint in place  Follow up with Orthopedics    Follow up with PCP in 3-5 days.  Proceed to  ER if symptoms worsen.    If tests have been performed at [facility], our office will contact you with results if changes need to be made to the care plan discussed with you at the visit.  You can [facility] full [facility] on [facility].    Chief Complaint     Chief Complaint   Patient presents with    Finger Injury     Pt reports right hand third digit finger injury that occurred this morning while gardening. Pt states injury resulted from hitting a rock. C/o loss of rom distal joint.         History of Present Illness       This is a 53 year old male who presents with dislocation to the tip of his R index finger.  He was digging  in the dirt and hit a rock. He denies any pain in the tip of his 5 th R finger. Finer is able to straighten and he denies nay pain.  He is unable to straighten the finger himself.  Xray shows no fracture.         Review of Systems   Review of Systems   Constitutional: Negative.    HENT: Negative.     Respiratory: Negative.     Cardiovascular: Negative.    Gastrointestinal: Negative.    Musculoskeletal:  Positive for arthralgias (TIp of 4th finger).   Neurological: Negative.          Current Medications       Current Outpatient Medications:     doxazosin (CARDURA) 2 mg tablet, Take 1 tablet (2 mg total) by mouth every morning, Disp: 90 tablet, Rfl: 1    doxazosin (CARDURA) 4 mg tablet, Take 1 tablet (4 mg total) by mouth daily at bedtime, Disp: 90 tablet, Rfl: 3    Docusate Sodium (STOOL SOFTENER LAXATIVE PO), Take by  "mouth (Patient not taking: Reported on 7/20/2024), Disp: , Rfl:     Magnesium 100 MG CAPS, Take by mouth (Patient not taking: Reported on 7/20/2024), Disp: , Rfl:     meclizine (ANTIVERT) 12.5 MG tablet, Take 1 tablet (12.5 mg total) by mouth 3 (three) times a day as needed for dizziness (Patient not taking: Reported on 7/20/2024), Disp: 30 tablet, Rfl: 0    Multiple Vitamin (MULTIVITAMINS PO), Take 1 capsule by mouth daily (Patient not taking: Reported on 7/20/2024), Disp: , Rfl:     Current Allergies     Allergies as of 07/20/2024    (No Known Allergies)            The following portions of the patient's history were reviewed and updated as appropriate: allergies, current medications, past family history, past medical history, past social history, past surgical history and problem list.     Past Medical History:   Diagnosis Date    Allergic April    Arthritis April    Hypertension     Shingles December    Swelling of finger joint of right hand     Right 3rd digit    Thoracic outlet syndrome        Past Surgical History:   Procedure Laterality Date    COLONOSCOPY      HERNIA REPAIR      TONSILLECTOMY         Family History   Problem Relation Age of Onset    Cancer Mother         Bladder    Hypertension Mother     Skin cancer Father     Arthritis Father     Diabetes Brother     Rheum arthritis Brother     Hypertension Brother     Diabetes Brother     Arthritis Brother     Arthritis Maternal Grandmother     Arthritis Paternal Uncle          Medications have been verified.        Objective   /82 (BP Location: Left arm, Patient Position: Sitting)   Pulse 98   Temp 97.6 °F (36.4 °C)   Resp 16   Ht 5' 10\" (1.778 m)   Wt 74.8 kg (165 lb)   SpO2 98%   BMI 23.68 kg/m²   No LMP for male patient.       Physical Exam     Physical Exam  Vitals reviewed.   Constitutional:       General: He is not in acute distress.     Appearance: Normal appearance. He is not ill-appearing.   HENT:      Head: Normocephalic and " atraumatic.   Eyes:      Extraocular Movements: Extraocular movements intact.      Conjunctiva/sclera: Conjunctivae normal.   Pulmonary:      Effort: Pulmonary effort is normal.   Musculoskeletal:        Arms:       Comments: Tip of R 4th finger flexed.  He is unable to straighten it. But if can manually be straightened without any pain   Skin:     General: Skin is warm.   Neurological:      General: No focal deficit present.      Mental Status: He is alert.   Psychiatric:         Mood and Affect: Mood normal.         Behavior: Behavior normal.         Judgment: Judgment normal.

## 2024-07-22 ENCOUNTER — OFFICE VISIT (OUTPATIENT)
Dept: OBGYN CLINIC | Facility: CLINIC | Age: 54
End: 2024-07-22
Payer: COMMERCIAL

## 2024-07-22 ENCOUNTER — TELEPHONE (OUTPATIENT)
Age: 54
End: 2024-07-22

## 2024-07-22 VITALS — OXYGEN SATURATION: 96 % | BODY MASS INDEX: 23.05 KG/M2 | HEIGHT: 70 IN | WEIGHT: 161 LBS | HEART RATE: 95 BPM

## 2024-07-22 DIAGNOSIS — S56.419A RUPTURE OF EXTENSOR TENDON OF FINGER: ICD-10-CM

## 2024-07-22 PROCEDURE — 99203 OFFICE O/P NEW LOW 30 MIN: CPT | Performed by: PHYSICIAN ASSISTANT

## 2024-07-22 NOTE — TELEPHONE ENCOUNTER
Caller: Patient    Doctor: Hand    Reason for call:     Patient is asking for an appointment, he injured his hand, Rupture of extensor tendon of finger, xrays in [facility], he wants to be seen soon.    Please advise if a forced appointment can be accommodated for the patient:    Insurance: [employer]    Requested doctor and/or location:     Any dr or any location    Thank you.k#: [phone]

## 2024-07-22 NOTE — PROGRESS NOTES
ASSESSMENT/PLAN:    Assessment:   Mallet finger, right ring (no bone involvement)    Plan:   Alumifoam extension splint of the DIP joint, dorsally       - Wear at all times 24/7       - Back up splints given for when one gets wet       - Change splint with hand flat on a table as demonstrated, to avoid flexion       Follow Up:  2-3 weeks with Dr. Castellanos    General Discussions:     Mallet Finger: The anatomy and physiology of a mallet finger was discussed with the patient today.  Typically, the extensor tendon is torn off of the dorsal aspect of the distal phalanx.  This results in flexion of the distal interphalangeal joint, with incomplete extension.  Typically, and less the joint is subluxed, this is treated through conservative measures.  An extension block splint is worn over the distal interphalangeal joint for 6 weeks continuously, followed by 6 weeks of nocturnal use.  After healing, there is typically a small flexion deformity at the distal interphalangeal joint.  Surgery does not typically change these results.        _____________________________________________________  CHIEF COMPLAINT:  Chief Complaint   Patient presents with    Right Ring Finger - Pain         SUBJECTIVE:  Bhavik Corado is a 53 y.o. male who presents with mallet deformity of the right ring finger.  This started  2 day(s) ago. He was using his hands to dig in the garden when his right ring finger struck a rock.  When he removed his glove, he noticed the mallet deformity.  Xrays done in urgent care were negative for fracture.    Radiation: None  Previous Treatments: splint   Associated symptoms: None  Handedness: right      PAST MEDICAL HISTORY:  Past Medical History:   Diagnosis Date    Allergic April    Arthritis April    Hypertension     Shingles December    Swelling of finger joint of right hand     Right 3rd digit    Thoracic outlet syndrome        PAST SURGICAL HISTORY:  Past Surgical History:   Procedure Laterality Date     "COLONOSCOPY      HERNIA REPAIR      TONSILLECTOMY         FAMILY HISTORY:  Family History   Problem Relation Age of Onset    Cancer Mother         Bladder    Hypertension Mother     Skin cancer Father     Arthritis Father     Diabetes Brother     Rheum arthritis Brother     Hypertension Brother     Diabetes Brother     Arthritis Brother     Arthritis Maternal Grandmother     Arthritis Paternal Uncle        SOCIAL HISTORY:  Social History     Tobacco Use    Smoking status: Never    Smokeless tobacco: Never   Vaping Use    Vaping status: Never Used   Substance Use Topics    Alcohol use: Yes     Comment: Social    Drug use: No       MEDICATIONS:    Current Outpatient Medications:     doxazosin (CARDURA) 2 mg tablet, Take 1 tablet (2 mg total) by mouth every morning, Disp: 90 tablet, Rfl: 1    doxazosin (CARDURA) 4 mg tablet, Take 1 tablet (4 mg total) by mouth daily at bedtime, Disp: 90 tablet, Rfl: 3    Docusate Sodium (STOOL SOFTENER LAXATIVE PO), Take by mouth (Patient not taking: Reported on 7/20/2024), Disp: , Rfl:     Magnesium 100 MG CAPS, Take by mouth (Patient not taking: Reported on 7/20/2024), Disp: , Rfl:     meclizine (ANTIVERT) 12.5 MG tablet, Take 1 tablet (12.5 mg total) by mouth 3 (three) times a day as needed for dizziness (Patient not taking: Reported on 7/20/2024), Disp: 30 tablet, Rfl: 0    Multiple Vitamin (MULTIVITAMINS PO), Take 1 capsule by mouth daily (Patient not taking: Reported on 7/20/2024), Disp: , Rfl:     ALLERGIES:  No Known Allergies    REVIEW OF SYSTEMS:  Pertinent items are noted in HPI.  A comprehensive review of systems was negative.    LABS:  HgA1c: No results found for: \"HGBA1C\"  BMP:   Lab Results   Component Value Date    CALCIUM 9.3 09/16/2023    K 3.7 09/16/2023    CO2 31 09/16/2023     09/16/2023    BUN 18 09/16/2023    CREATININE 1.20 09/16/2023         _____________________________________________________  PHYSICAL EXAMINATION:  Vital signs: Pulse 95   Ht 5' 10\" " (1.778 m)   Wt 73 kg (161 lb)   SpO2 96%   BMI 23.10 kg/m²   General: well developed and well nourished, alert, oriented times 3, and appears comfortable  Psychiatric: Normal  HEENT: Trachea Midline, No torticollis  Cardiovascular: Regular rate and rhythm  Pulmonary: No audible wheezing   Abdomen: No guarding  Extremities: No lymphedema  Skin: No masses, erythema, lacerations, fluctation, ulcerations  Neurovascular: Sensation Intact to the Median, Ulnar, Radial Nerve, Motor Intact to the Median, Ulnar, Radial Nerve, and Pulses Intact    MUSCULOSKELETAL EXAMINATION:  Right ring finger - Mallet deformity.  No swelling, erythema, ecchymosis, or open skin.  Full passive DIP ROM.  No active DIP extension. No laxity of the collaterals.  NVI distally.        _____________________________________________________  STUDIES REVIEWED:  Xrays are negative for mallet fracture      PROCEDURES PERFORMED:  Procedures  No Procedures performed today

## 2024-08-12 ENCOUNTER — OFFICE VISIT (OUTPATIENT)
Dept: OBGYN CLINIC | Facility: CLINIC | Age: 54
End: 2024-08-12
Payer: COMMERCIAL

## 2024-08-12 VITALS — BODY MASS INDEX: 24.62 KG/M2 | HEART RATE: 75 BPM | HEIGHT: 70 IN | WEIGHT: 172 LBS | OXYGEN SATURATION: 96 %

## 2024-08-12 DIAGNOSIS — M20.011 MALLET FINGER OF RIGHT HAND: Primary | ICD-10-CM

## 2024-08-12 PROCEDURE — 99213 OFFICE O/P EST LOW 20 MIN: CPT | Performed by: STUDENT IN AN ORGANIZED HEALTH CARE EDUCATION/TRAINING PROGRAM

## 2024-08-12 NOTE — PROGRESS NOTES
ORTHOPAEDIC HAND, WRIST, AND ELBOW OFFICE  VISIT      ASSESSMENT/PLAN:      Diagnoses and all orders for this visit:    Mallet finger of right hand  -     Ambulatory Referral to PT/OT Hand Therapy; Future          53 y.o. male with R ring mallet finger, DOI 07/20/2024.   XRs reviewed with pt today.   Treatment options and expected outcomes were discussed.  The patient verbalized understanding of exam findings and treatment plan.   The patient was given the opportunity to ask questions.  Questions were answered to the patient's satisfaction.  The patient decided to move forward with continued splinting.  We discussed that minimum amount of splinting would be in 3 weeks, but the most conservative option would be to splint for an additional 6 weeks since his splint did fall off last night.   Discussed that since he replaced the splint immediately, the chance that splinting again for a full 6 weeks is necessary is low, but it would be an option if he doesn't want to take any chances.       Follow Up:  3 weeks       To Do Next Visit:  Re-evaluation of current issue      Discussions:  Mallet Finger: The anatomy and physiology of a mallet finger was discussed with the patient today.  Typically, the extensor tendon is torn off of the dorsal aspect of the distal phalanx.  This results in a flexed posture of the distal interphalangeal joint, with incomplete extension (ie. A drooped finger).  Typically this is treated through conservative measures.  An extension block splint is worn over the distal interphalangeal joint for 6-8 weeks continuously, followed by 4-6 weeks of nocturnal use.  After healing, there is typically a small flexion deformity at the distal interphalangeal joint.  Surgery does not typically change these results.       Cullen Castellanos MD  Attending, Orthopaedic Surgery  Hand, Wrist, and Elbow Surgery  [facility]    ______________________________________________________________________________________________    CHIEF COMPLAINT:  Chief Complaint   Patient presents with   • Right Ring Finger - Pain       SUBJECTIVE:  Patient is a 53 y.o. RHD male who presents today for evaluation and treatment of right ring mallet finger. Pt states he was gardening on 07/20/2024 when his right ring finger struck a rock. He was seen in urgent care where XRs were negative for fracture. He followed up with Koko Miner PA-C 2 days later and was educated on importance of wearing the extension splint at all times and shown how to change this to avoid flexion. Pt states he has been using a separate splint for showers and keeping the finger straight when switching. He states that the splint has slipped off about 4x though, with the most recent being last night. States he would put the splint immediately back on.     Occupation: Clerical work    I have personally reviewed all the relevant PMH, PSH, SH, FH, Medications and allergies      PAST MEDICAL HISTORY:  Past Medical History:   Diagnosis Date   • Allergic April   • Arthritis April   • Hypertension    • Shingles December   • Swelling of finger joint of right hand     Right 3rd digit   • Thoracic outlet syndrome        PAST SURGICAL HISTORY:  Past Surgical History:   Procedure Laterality Date   • COLONOSCOPY     • HERNIA REPAIR     • TONSILLECTOMY         FAMILY HISTORY:  Family History   Problem Relation Age of Onset   • Cancer Mother         Bladder   • Hypertension Mother    • Skin cancer Father    • Arthritis Father    • Diabetes Brother    • Rheum arthritis Brother    • Hypertension Brother    • Diabetes Brother    • Arthritis Brother    • Arthritis Maternal Grandmother    • Arthritis Paternal Uncle        SOCIAL HISTORY:  Social History     Tobacco Use   • Smoking status: Never   • Smokeless tobacco: Never   Vaping Use   • Vaping status: Never Used   Substance Use Topics   •  "Alcohol use: Yes     Comment: Social   • Drug use: No       MEDICATIONS:    Current Outpatient Medications:   •  doxazosin (CARDURA) 4 mg tablet, Take 1 tablet (4 mg total) by mouth daily at bedtime, Disp: 90 tablet, Rfl: 3  •  Magnesium 100 MG CAPS, Take by mouth, Disp: , Rfl:   •  Multiple Vitamin (MULTIVITAMINS PO), Take 1 capsule by mouth daily, Disp: , Rfl:   •  Docusate Sodium (STOOL SOFTENER LAXATIVE PO), Take by mouth (Patient not taking: Reported on 7/20/2024), Disp: , Rfl:   •  doxazosin (CARDURA) 2 mg tablet, Take 1 tablet (2 mg total) by mouth every morning (Patient not taking: Reported on 8/12/2024), Disp: 90 tablet, Rfl: 1  •  meclizine (ANTIVERT) 12.5 MG tablet, Take 1 tablet (12.5 mg total) by mouth 3 (three) times a day as needed for dizziness (Patient not taking: Reported on 7/20/2024), Disp: 30 tablet, Rfl: 0    ALLERGIES:  No Known Allergies        REVIEW OF SYSTEMS:  Musculoskeletal:        As noted in HPI.   All other systems reviewed and are negative.    VITALS:  Vitals:    08/12/24 1303   Pulse: 75   SpO2: 96%       LABS:  HgA1c: No results found for: \"HGBA1C\"  BMP:   Lab Results   Component Value Date    CALCIUM 9.3 09/16/2023    K 3.7 09/16/2023    CO2 31 09/16/2023     09/16/2023    BUN 18 09/16/2023    CREATININE 1.20 09/16/2023       _____________________________________________________  PHYSICAL EXAMINATION:  General: Well developed and well nourished, alert & oriented x 3, appears comfortable  Psychiatric: Normal  HEENT: Normocephalic, Atraumatic Trachea Midline, No torticollis  Pulmonary: No audible wheezing or respiratory distress   Abdomen/GI: Non tender, non distended   Cardiovascular: No pitting edema, 2+ radial pulse   Skin: No masses, erythema, lacerations, fluctation, ulcerations  Neurovascular: Sensation Intact to the Median, Ulnar, Radial Nerve, Motor Intact to the Median, Ulnar, Radial Nerve, and Pulses Intact  Musculoskeletal: Normal, except as noted in detailed exam " and in HPI.      MUSCULOSKELETAL EXAMINATION:  Right Ring Finger:  Skin intact.  Did not test motion today 2/2 pt's injury  Finger appears well-perfused.     ___________________________________________________  STUDIES REVIEWED:  Xrays of the right hand  were reviewed and independently interpreted in PACS by Dr. Castellanos and demonstrate a 70 degree extension lag of the ring finger DIP.           PROCEDURES PERFORMED:  Procedures  No Procedures performed today    _____________________________________________________      Scribe Attestation    I,:  Pinky Philip PA-C am acting as a scribe while in the presence of the attending physician.:       I,:  Cullen Castellanos MD personally performed the services described in this documentation    as scribed in my presence.:

## 2024-09-09 ENCOUNTER — OFFICE VISIT (OUTPATIENT)
Dept: OBGYN CLINIC | Facility: CLINIC | Age: 54
End: 2024-09-09
Payer: COMMERCIAL

## 2024-09-09 VITALS — BODY MASS INDEX: 24.62 KG/M2 | WEIGHT: 172 LBS | HEIGHT: 70 IN

## 2024-09-09 DIAGNOSIS — M20.011 MALLET FINGER OF RIGHT HAND: Primary | ICD-10-CM

## 2024-09-09 PROCEDURE — 99213 OFFICE O/P EST LOW 20 MIN: CPT | Performed by: STUDENT IN AN ORGANIZED HEALTH CARE EDUCATION/TRAINING PROGRAM

## 2024-09-09 NOTE — PROGRESS NOTES
ORTHOPAEDIC HAND, WRIST, AND ELBOW OFFICE  VISIT      ASSESSMENT/PLAN:      Diagnoses and all orders for this visit:    Mallet finger of right hand          54 y.o. male with R ring mallet finger, DOI 07/20/2024     The patient is doing well. On exam, the DIP joint is 5 degree shy of full extension. He was instructed to transition to night time splinting for the next 6 weeks. If he begins to develop an increase in the droop he was advised to return to full time splinting. Discussed swelling can take months to improve. He was advised to work on motion.    Follow Up:  6 weeks       To Do Next Visit:  Re-evaluation of current issue        Cullen Castellanos MD  Attending, Orthopaedic Surgery  Hand, Wrist, and Elbow Surgery  [facility]    ______________________________________________________________________________________________    CHIEF COMPLAINT:  Chief Complaint   Patient presents with   • Right Ring Finger - Follow-up       SUBJECTIVE:  Patient is a 54 y.o. RHD male who presents today for follow up of R ring mallet finger, DOI 07/20/2024.  The patient states he is doing well. He has been compliant with full time splinting. He notes some soreness and pain if its bumped. He also notes some swelling.     Occupation: Clerical work      I have personally reviewed all the relevant PMH, PSH, SH, FH, Medications and allergies      PAST MEDICAL HISTORY:  Past Medical History:   Diagnosis Date   • Allergic April   • Arthritis April   • Hypertension    • Shingles December   • Swelling of finger joint of right hand     Right 3rd digit   • Thoracic outlet syndrome        PAST SURGICAL HISTORY:  Past Surgical History:   Procedure Laterality Date   • COLONOSCOPY     • HERNIA REPAIR     • TONSILLECTOMY         FAMILY HISTORY:  Family History   Problem Relation Age of Onset   • Cancer Mother         Bladder   • Hypertension Mother    • Skin cancer Father    • Arthritis Father    • Diabetes Brother    • Rheum  "arthritis Brother    • Hypertension Brother    • Diabetes Brother    • Arthritis Brother    • Arthritis Maternal Grandmother    • Arthritis Paternal Uncle        SOCIAL HISTORY:  Social History     Tobacco Use   • Smoking status: Never   • Smokeless tobacco: Never   Vaping Use   • Vaping status: Never Used   Substance Use Topics   • Alcohol use: Yes     Comment: Social   • Drug use: No       MEDICATIONS:    Current Outpatient Medications:   •  doxazosin (CARDURA) 4 mg tablet, Take 1 tablet (4 mg total) by mouth daily at bedtime, Disp: 90 tablet, Rfl: 3  •  Magnesium 100 MG CAPS, Take by mouth, Disp: , Rfl:   •  Multiple Vitamin (MULTIVITAMINS PO), Take 1 capsule by mouth daily, Disp: , Rfl:   •  Docusate Sodium (STOOL SOFTENER LAXATIVE PO), Take by mouth (Patient not taking: Reported on 7/20/2024), Disp: , Rfl:   •  doxazosin (CARDURA) 2 mg tablet, Take 1 tablet (2 mg total) by mouth every morning (Patient not taking: Reported on 8/12/2024), Disp: 90 tablet, Rfl: 1  •  meclizine (ANTIVERT) 12.5 MG tablet, Take 1 tablet (12.5 mg total) by mouth 3 (three) times a day as needed for dizziness (Patient not taking: Reported on 7/20/2024), Disp: 30 tablet, Rfl: 0    ALLERGIES:  No Known Allergies        REVIEW OF SYSTEMS:  Musculoskeletal:        As noted in HPI.   All other systems reviewed and are negative.    VITALS:  There were no vitals filed for this visit.    LABS:  HgA1c: No results found for: \"HGBA1C\"  BMP:   Lab Results   Component Value Date    CALCIUM 9.3 09/16/2023    K 3.7 09/16/2023    CO2 31 09/16/2023     09/16/2023    BUN 18 09/16/2023    CREATININE 1.20 09/16/2023       _____________________________________________________  PHYSICAL EXAMINATION:  General: Well developed and well nourished, alert & oriented x 3, appears comfortable  Psychiatric: Normal  HEENT: Normocephalic, Atraumatic Trachea Midline, No torticollis  Pulmonary: No audible wheezing or respiratory distress   Abdomen/GI: Non tender, " non distended   Cardiovascular: No pitting edema, 2+ radial pulse   Skin: No masses, erythema, lacerations, fluctation, ulcerations  Neurovascular: Sensation Intact to the Median, Ulnar, Radial Nerve, Motor Intact to the Median, Ulnar, Radial Nerve, and Pulses Intact  Musculoskeletal: Normal, except as noted in detailed exam and in HPI.      MUSCULOSKELETAL EXAMINATION:  Right ring finger  5 degree shy of full extension at the DIP  Compartments soft  Brisk capillary refill     ___________________________________________________  STUDIES REVIEWED:  Prior note reviewed from GABRIEL Yip  Prior right hand x-rays reviewed        PROCEDURES PERFORMED:  Procedures  No Procedures performed today    _____________________________________________________      Scribe Attestation    I,:  Rufina Galvan MA am acting as a scribe while in the presence of the attending physician.:       I,:  Cullen Castellanos MD personally performed the services described in this documentation    as scribed in my presence.:

## 2024-10-21 ENCOUNTER — OFFICE VISIT (OUTPATIENT)
Dept: OBGYN CLINIC | Facility: CLINIC | Age: 54
End: 2024-10-21
Payer: COMMERCIAL

## 2024-10-21 VITALS — WEIGHT: 172 LBS | HEIGHT: 70 IN | BODY MASS INDEX: 24.62 KG/M2

## 2024-10-21 DIAGNOSIS — M20.011 MALLET FINGER OF RIGHT HAND: Primary | ICD-10-CM

## 2024-10-21 PROCEDURE — 99214 OFFICE O/P EST MOD 30 MIN: CPT | Performed by: STUDENT IN AN ORGANIZED HEALTH CARE EDUCATION/TRAINING PROGRAM

## 2024-10-21 NOTE — PROGRESS NOTES
ORTHOPAEDIC HAND, WRIST, AND ELBOW OFFICE  VISIT      ASSESSMENT/PLAN:      Diagnoses and all orders for this visit:    Mallet finger of right hand          54 y.o. male with R ring mallet finger, DOI 07/20/2024   Overall Bhavik is doing well. His DIP joint is 10 degrees shy of full extension.   We discussed swelling can take up to a year to resolve and at times never fully resolve.   We discussed OT can be beneficial for flexion exercises, he declined at this time.   He may resume activities to his tolerance, no restrictions.   I will see him back in the office on an as needed basis.       Follow Up:  PRN       To Do Next Visit:  Re-evaluation of current issue      Cullen Castellanos MD  Attending, Orthopaedic Surgery  Hand, Wrist, and Elbow Surgery  [facility]    ______________________________________________________________________________________________    CHIEF COMPLAINT:  Chief Complaint   Patient presents with    Right Ring Finger - Follow-up       SUBJECTIVE:  Patient is a 54 y.o. RHD male who presents today for follow up of a right ring finger mallet. Overall Edgardo is doing well. He has been wearing the finger splint at night. He feels flexion is limited and the finger is still swollen. He feels his finger doesn't look normal and he has a lump to the dorsal aspect of the finger.        Occupation: clerical work      I have personally reviewed all the relevant PMH, PSH, SH, FH, Medications and allergies      PAST MEDICAL HISTORY:  Past Medical History:   Diagnosis Date    Allergic April    Arthritis April    Hypertension     Shingles December    Swelling of finger joint of right hand     Right 3rd digit    Thoracic outlet syndrome        PAST SURGICAL HISTORY:  Past Surgical History:   Procedure Laterality Date    COLONOSCOPY      HERNIA REPAIR      TONSILLECTOMY         FAMILY HISTORY:  Family History   Problem Relation Age of Onset    Cancer Mother         Bladder    Hypertension Mother  "    Skin cancer Father     Arthritis Father     Diabetes Brother     Rheum arthritis Brother     Hypertension Brother     Diabetes Brother     Arthritis Brother     Arthritis Maternal Grandmother     Arthritis Paternal Uncle        SOCIAL HISTORY:  Social History     Tobacco Use    Smoking status: Never    Smokeless tobacco: Never   Vaping Use    Vaping status: Never Used   Substance Use Topics    Alcohol use: Yes     Comment: Social    Drug use: No       MEDICATIONS:    Current Outpatient Medications:     doxazosin (CARDURA) 4 mg tablet, Take 1 tablet (4 mg total) by mouth daily at bedtime, Disp: 90 tablet, Rfl: 3    Magnesium 100 MG CAPS, Take by mouth, Disp: , Rfl:     Multiple Vitamin (MULTIVITAMINS PO), Take 1 capsule by mouth daily, Disp: , Rfl:     Docusate Sodium (STOOL SOFTENER LAXATIVE PO), Take by mouth (Patient not taking: Reported on 7/20/2024), Disp: , Rfl:     doxazosin (CARDURA) 2 mg tablet, Take 1 tablet (2 mg total) by mouth every morning (Patient not taking: Reported on 8/12/2024), Disp: 90 tablet, Rfl: 1    meclizine (ANTIVERT) 12.5 MG tablet, Take 1 tablet (12.5 mg total) by mouth 3 (three) times a day as needed for dizziness (Patient not taking: Reported on 7/20/2024), Disp: 30 tablet, Rfl: 0    ALLERGIES:  No Known Allergies        REVIEW OF SYSTEMS:  Musculoskeletal:        As noted in HPI.   All other systems reviewed and are negative.    VITALS:  There were no vitals filed for this visit.    LABS:  HgA1c: No results found for: \"HGBA1C\"  BMP:   Lab Results   Component Value Date    CALCIUM 9.3 09/16/2023    K 3.7 09/16/2023    CO2 31 09/16/2023     09/16/2023    BUN 18 09/16/2023    CREATININE 1.20 09/16/2023       _____________________________________________________  PHYSICAL EXAMINATION:  General: Well developed and well nourished, alert & oriented x 3, appears comfortable  Psychiatric: Normal  HEENT: Normocephalic, Atraumatic Trachea Midline, No torticollis  Pulmonary: No audible " wheezing or respiratory distress   Abdomen/GI: Non tender, non distended   Cardiovascular: No pitting edema, 2+ radial pulse   Skin: No masses, erythema, lacerations, fluctation, ulcerations  Neurovascular: Sensation Intact to the Median, Ulnar, Radial Nerve, Motor Intact to the Median, Ulnar, Radial Nerve, and Pulses Intact  Musculoskeletal: Normal, except as noted in detailed exam and in HPI.      MUSCULOSKELETAL EXAMINATION:    Right ring finger:     No erythema or ecchymosis   Mild edema  DIP joint is 10 degrees shy of full extension   DIP flexion 80 degrees   Swelling noted over dorsal aspect of middle phalanx  Brisk capillary refill     ___________________________________________________  STUDIES REVIEWED:  Urgent care note from 7/20/24 reviewed  Prior right hand x-rays reviewed  CRP 6/24/20 reviewed: <3      PROCEDURES PERFORMED:  Procedures  No Procedures performed today    _____________________________________________________      Scribe Attestation      I,:  Martha Galvan MA am acting as a scribe while in the presence of the attending physician.:       I,:  Cullen Castellanos MD personally performed the services described in this documentation    as scribed in my presence.:

## 2025-03-17 ENCOUNTER — OFFICE VISIT (OUTPATIENT)
Dept: FAMILY MEDICINE CLINIC | Facility: CLINIC | Age: 55
End: 2025-03-17
Payer: COMMERCIAL

## 2025-03-17 VITALS
DIASTOLIC BLOOD PRESSURE: 86 MMHG | HEART RATE: 70 BPM | SYSTOLIC BLOOD PRESSURE: 142 MMHG | RESPIRATION RATE: 16 BRPM | HEIGHT: 70 IN | TEMPERATURE: 97.8 F | BODY MASS INDEX: 25.31 KG/M2 | WEIGHT: 176.8 LBS | OXYGEN SATURATION: 98 %

## 2025-03-17 DIAGNOSIS — N40.1 BENIGN PROSTATIC HYPERPLASIA WITH URINARY HESITANCY: ICD-10-CM

## 2025-03-17 DIAGNOSIS — M54.2 CHRONIC NECK PAIN: ICD-10-CM

## 2025-03-17 DIAGNOSIS — Z12.5 PROSTATE CANCER SCREENING: ICD-10-CM

## 2025-03-17 DIAGNOSIS — Z00.00 ENCOUNTER FOR WELLNESS EXAMINATION IN ADULT: Primary | ICD-10-CM

## 2025-03-17 DIAGNOSIS — G44.52 HEADACHE, NEW DAILY PERSISTENT (NDPH): ICD-10-CM

## 2025-03-17 DIAGNOSIS — M79.10 MYALGIA: ICD-10-CM

## 2025-03-17 DIAGNOSIS — M54.41 CHRONIC BILATERAL LOW BACK PAIN WITH BILATERAL SCIATICA: ICD-10-CM

## 2025-03-17 DIAGNOSIS — F41.9 ANXIETY: ICD-10-CM

## 2025-03-17 DIAGNOSIS — G56.03 CARPAL TUNNEL SYNDROME, BILATERAL: ICD-10-CM

## 2025-03-17 DIAGNOSIS — G89.29 CHRONIC BILATERAL LOW BACK PAIN WITH BILATERAL SCIATICA: ICD-10-CM

## 2025-03-17 DIAGNOSIS — Z13.220 ENCOUNTER FOR SCREENING FOR LIPID DISORDER: ICD-10-CM

## 2025-03-17 DIAGNOSIS — R39.11 BENIGN PROSTATIC HYPERPLASIA WITH URINARY HESITANCY: ICD-10-CM

## 2025-03-17 DIAGNOSIS — R42 DIZZINESS: ICD-10-CM

## 2025-03-17 DIAGNOSIS — G89.29 CHRONIC NECK PAIN: ICD-10-CM

## 2025-03-17 DIAGNOSIS — M54.42 CHRONIC BILATERAL LOW BACK PAIN WITH BILATERAL SCIATICA: ICD-10-CM

## 2025-03-17 DIAGNOSIS — E55.9 VITAMIN D DEFICIENCY: ICD-10-CM

## 2025-03-17 PROBLEM — M54.50 CHRONIC BILATERAL LOW BACK PAIN: Status: ACTIVE | Noted: 2023-09-08

## 2025-03-17 PROCEDURE — 99396 PREV VISIT EST AGE 40-64: CPT | Performed by: FAMILY MEDICINE

## 2025-03-17 PROCEDURE — 99214 OFFICE O/P EST MOD 30 MIN: CPT | Performed by: FAMILY MEDICINE

## 2025-03-17 RX ORDER — DOXAZOSIN 2 MG/1
2 TABLET ORAL EVERY MORNING
Qty: 90 TABLET | Refills: 3 | Status: SHIPPED | OUTPATIENT
Start: 2025-03-17 | End: 2025-03-19

## 2025-03-17 RX ORDER — DOXAZOSIN 4 MG/1
4 TABLET ORAL
Qty: 90 TABLET | Refills: 3 | Status: SHIPPED | OUTPATIENT
Start: 2025-03-17

## 2025-03-17 NOTE — PROGRESS NOTES
Name: Bhavik Corado      : 1970      MRN: [id number]  Encounter Provider: Karen Yanez MD  Encounter Date: 3/17/2025   Encounter department: [facility]    Assessment & Plan  Carpal tunnel syndrome, bilateral    Orders:  •  US MSK limited; Future    Chronic bilateral low back pain with bilateral sciatica    Orders:  •  Ambulatory Referral to Physical Therapy; Future    Chronic neck pain    Orders:  •  Ambulatory Referral to Physical Therapy; Future    Headache, new daily persistent (NDPH)    Orders:  •  MRI brain w wo contrast; Future  •  Ambulatory Referral to Physical Therapy; Future  •  CBC and differential; Future  •  Comprehensive metabolic panel; Future  •  TSH, 3rd generation; Future  •  Vitamin D 25 hydroxy; Future    Dizziness    Orders:  •  MRI brain w wo contrast; Future    Prostate cancer screening    Orders:  •  PSA, Total Screen; Future    Myalgia    Orders:  •  Aldolase; Future  •  CK; Future  •  Sedimentation rate, automated; Future  •  C-reactive protein; Future    Vitamin D deficiency    Orders:  •  Vitamin D 25 hydroxy; Future    Encounter for screening for lipid disorder    Orders:  •  Lipid Panel with Direct LDL reflex; Future    Benign prostatic hyperplasia without lower urinary tract symptoms    Orders:  •  doxazosin (CARDURA) 2 mg tablet; Take 1 tablet (2 mg total) by mouth every morning    Benign prostatic hyperplasia with urinary hesitancy    Orders:  •  doxazosin (CARDURA) 4 mg tablet; Take 1 tablet (4 mg total) by mouth daily at bedtime       Patient Instructions   Wrist ultrasound to rule out carpal tunnel, [phone]  Brain MRI  Please track blood pressures at home and update me in 2 to 3 weeks  Please start physical therapy for neck and low back pain if neck pain and arm discomfort will persist after 10 treatments we will consider MRI  Blood work 12 hour fasting    History of Present Illness   {?Quick Links Encounters * My Last Note * Last Note in  Specialty * Snapshot * Since Last Visit * History :08414}   Recurrent  lightheafdedness  Attended 4 vestibular    Anuakl wellexam    Fuzzy and non -focused   Short ter memory is wose altely    On carduara 6 mg qhs     Back pain - nexck- to limbar spien    Upper and lower eyelid  twtching    Skin crawling sensation     Occasionally  waking up    Wakes up frequently  throughout the night     1-3 fingers twitchinga nd involuntary  mobing    Chronic low back pain  Spine MRI of lumbar spine and 23  X-ray of C-spine 2019, unremarkable  Occ headaches -sometimes with nausea-  ussually  milder ( proloneged sitting - arre migraines)  Occasional spinning component    EMG/nerve conduction study of upper extremities:  Evidence of bilateral mild carpal tunnel syndrome, no evidence of cervical radiculopathy.      Detahed and unfocused      UTD with t eye doctor   dentists    Neck pain radiates to both arms right more than left, back pain radiates to both legs           Review of Systems   Constitutional: Negative.    HENT: Negative.     Eyes: Negative.    Respiratory: Negative.     Cardiovascular: Negative.    Gastrointestinal: Negative.    Endocrine: Negative.    Genitourinary: Negative.    Musculoskeletal: Negative.    Allergic/Immunologic: Negative.    Neurological:  Positive for light-headedness. Negative for headaches.   Hematological: Negative.    Psychiatric/Behavioral: Negative.       Past Medical History:   Diagnosis Date   • Allergic April   • Arthritis April   • Hypertension    • Shingles December   • Swelling of finger joint of right hand 6/26/2020    Right 3rd digit   • Thoracic outlet syndrome 10/7/2019     Past Surgical History:   Procedure Laterality Date   • COLONOSCOPY     • HERNIA REPAIR     • TONSILLECTOMY       Family History   Problem Relation Age of Onset   • Cancer Mother         Bladder   • Hypertension Mother    • Skin cancer Father    • Arthritis Father    • Diabetes Brother    • Rheum arthritis Brother   "  • Hypertension Brother    • Diabetes Brother    • Arthritis Brother    • Arthritis Maternal Grandmother    • Arthritis Paternal Uncle      Social History     Tobacco Use   • Smoking status: Never   • Smokeless tobacco: Never   Vaping Use   • Vaping status: Never Used   Substance and Sexual Activity   • Alcohol use: Yes     Comment: Social   • Drug use: No   • Sexual activity: Yes     Partners: Female     Birth control/protection: None     Current Outpatient Medications on File Prior to Visit   Medication Sig   • Magnesium 100 MG CAPS Take by mouth   • Multiple Vitamin (MULTIVITAMINS PO) Take 1 capsule by mouth daily     No Known Allergies    There is no immunization history on file for this patient.  Objective {?Quick Links Trend Vitals * Enter New Vitals * Results Review * Timeline (Adult) * Labs * Imaging * Cardiology * Procedures * Lung Cancer Screening * Surgical eConsent :15771}  /86   Pulse 70   Temp 97.8 °F (36.6 °C) (Temporal)   Resp 16   Ht 5' 10\" (1.778 m)   Wt 80.2 kg (176 lb 12.8 oz)   SpO2 98%   BMI 25.37 kg/m²     Physical Exam  {Administrative / Billing Section (Optional):14172}  "

## 2025-03-17 NOTE — PATIENT INSTRUCTIONS
Wrist ultrasound to rule out carpal tunnel, [phone]  Brain MRI  Please track blood pressures at home and update me in 2 to 3 weeks  Please start physical therapy for neck and low back pain if neck pain and arm discomfort will persist after 10 treatments we will consider MRI  Blood work 12 hour fasting

## 2025-03-17 NOTE — ASSESSMENT & PLAN NOTE
Orders:  •  doxazosin (CARDURA) 4 mg tablet; Take 1 tablet (4 mg total) by mouth daily at bedtime

## 2025-03-19 PROBLEM — M54.9 UPPER BACK PAIN ON LEFT SIDE: Status: RESOLVED | Noted: 2019-10-17 | Resolved: 2025-03-19

## 2025-03-19 PROBLEM — M54.2 CHRONIC NECK PAIN: Status: ACTIVE | Noted: 2025-03-19

## 2025-03-19 PROBLEM — G44.52 HEADACHE, NEW DAILY PERSISTENT (NDPH): Status: ACTIVE | Noted: 2025-03-19

## 2025-03-19 PROBLEM — G89.29 CHRONIC NECK PAIN: Status: ACTIVE | Noted: 2025-03-19

## 2025-03-19 PROBLEM — F41.9 ANXIETY: Status: ACTIVE | Noted: 2025-03-19

## 2025-03-19 PROBLEM — R42 DIZZINESS: Status: ACTIVE | Noted: 2025-03-19

## 2025-03-19 RX ORDER — DOXAZOSIN 2 MG/1
2 TABLET ORAL EVERY MORNING
Start: 2025-03-19

## 2025-03-20 NOTE — ASSESSMENT & PLAN NOTE
BPH symptoms have been well-controlled on Cardura.  Patient will start monitoring blood pressures at home  Orders:  •  doxazosin (CARDURA) 4 mg tablet; Take 1 tablet (4 mg total) by mouth daily at bedtime  •  doxazosin (CARDURA) 2 mg tablet; Take 1 tablet (2 mg total) by mouth every morning

## 2025-03-20 NOTE — ASSESSMENT & PLAN NOTE
Neck spasm.  Neck pain radiating to both upper extremities.  X-ray performed in 2019 was unremarkable.  Start PT.  Consider MRI cervical spine if symptoms are not improving  Orders:  •  Ambulatory Referral to Physical Therapy; Future

## 2025-03-20 NOTE — ASSESSMENT & PLAN NOTE
Patient reports being under significant stress, grieving loss of his mother.  Proceed with diagnostic workup.  If no pertinent findings may consider trial of Rx.  Schedule follow-up in 3 months.

## 2025-03-20 NOTE — ASSESSMENT & PLAN NOTE
Orders:  •  MRI brain w wo contrast; Future  •  Ambulatory Referral to Physical Therapy; Future  •  CBC and differential; Future  •  Comprehensive metabolic panel; Future  •  TSH, 3rd generation; Future  •  Vitamin D 25 hydroxy; Future

## 2025-03-20 NOTE — PROGRESS NOTES
Adult Annual Physical  Name: Bhavik Corado      : 1970      MRN: [id number]  Encounter Provider: Karen Yanez MD  Encounter Date: 3/17/2025   Encounter department: [facility]    Assessment & Plan  Encounter for wellness examination in adult         Carpal tunnel syndrome, bilateral  Carpal tunnel syndrome suspected EMG in 2019.  Presents with complaints of finger pain radiating to the forearms.  Proceed with wrist ultrasound  Orders:  •  US MSK limited; Future    Chronic bilateral low back pain with bilateral sciatica    Orders:  •  Ambulatory Referral to Physical Therapy; Future    Chronic neck pain  Neck spasm.  Neck pain radiating to both upper extremities.  X-ray performed in 2019 was unremarkable.  Start PT.  Consider MRI cervical spine if symptoms are not improving  Orders:  •  Ambulatory Referral to Physical Therapy; Future    Headache, new daily persistent (NDPH)  Persistent headaches and dizziness.  Migraine and tension components.  Dizziness has been present for almost a year with no improvement with vestibular PT.  Patient reports sensation of fogginess and poor concentration.  Proceed with brain MRI and labs.  Orders:  •  MRI brain w wo contrast; Future  •  Ambulatory Referral to Physical Therapy; Future  •  CBC and differential; Future  •  Comprehensive metabolic panel; Future  •  TSH, 3rd generation; Future  •  Vitamin D 25 hydroxy; Future    Dizziness    Orders:  •  MRI brain w wo contrast; Future    Prostate cancer screening    Orders:  •  PSA, Total Screen; Future    Myalgia  Patient reports sensation of skin paresthesias and intermittent muscle twitching.  Check labs.  Anxiety could be contributing factor  Orders:  •  Aldolase; Future  •  CK; Future  •  Sedimentation rate, automated; Future  •  C-reactive protein; Future    Vitamin D deficiency    Orders:  •  Vitamin D 25 hydroxy; Future    Encounter for screening for lipid disorder    Orders:  •  Lipid Panel  with Direct LDL reflex; Future    Benign prostatic hyperplasia with urinary hesitancy  BPH symptoms have been well-controlled on Cardura.  Patient will start monitoring blood pressures at home  Orders:  •  doxazosin (CARDURA) 4 mg tablet; Take 1 tablet (4 mg total) by mouth daily at bedtime  •  doxazosin (CARDURA) 2 mg tablet; Take 1 tablet (2 mg total) by mouth every morning    Anxiety  Patient reports being under significant stress, grieving loss of his mother.  Proceed with diagnostic workup.  If no pertinent findings may consider trial of Rx.  Schedule follow-up in 3 months.       Preventive Screenings:  - Diabetes Screening: risks/benefits discussed and orders placed  - Cholesterol Screening: risks/benefits discussed and orders placed   - Colon cancer screening: screening up-to-date   - Lung cancer screening: screening not indicated   - Prostate cancer screening: risks/benefits discussed and orders placed     Counseling/Anticipatory Guidance:    - Diet: discussed recommendations for a healthy/well-balanced diet.   - Exercise: the importance of regular exercise/physical activity was discussed. Recommend exercise 3-5 times per week for at least 30 minutes.       Depression Screening and Follow-up Plan: Patient was screened for depression during today's encounter. They screened negative with a PHQ-2 score of 0.        Patient Instructions   Wrist ultrasound to rule out carpal tunnel, [phone]  Brain MRI  Please track blood pressures at home and update me in 2 to 3 weeks  Please start physical therapy for neck and low back pain if neck pain and arm discomfort will persist after 10 treatments we will consider MRI  Blood work 12 hour fasting    Return in about 3 months (around 6/17/2025) for follow up.    History of Present Illness     Adult Annual Physical:  Patient presents for annual physical. Annual well exam.    Patient presents with concerns of recurrent lightheadedness for almost a year.  He completed 4  "vestibular therapy sessions with no significant improvement.  He reports sensation of fuzziness and fogginess in his head.  Concerned about his short-term memory and decreased concentration.  Feels anxious, grieving unexpected loss of his mother a year ago.  Wakes up frequently throughout the night.  Reports neck pain.  Recent onset of occasional upper and lower eyelid twitching.  Skin crawling sensation.  Frequently feels \"detached and unfocused\"      Low back pain is chronic.  Previously evaluated with MRI of lumbar spine in 2023, was seen by [facility].  Previous x-ray of cervical spine was performed in 2019 and was essentially unremarkable.  Headaches have been more persistent lately.  Sometimes associated with nause-migraine type.  Most of the time headaches are tension, originating in the back of the neck.  Patient had previous evaluation with EMG/nerve conduction study of upper extremities revealing mild carpal tunnel syndrome and no evidence of cervical radiculopathy (2019).  Patient reports twitching of first second and third digits both hands.  Pain radiates to both arms right more than left.  Back pain with bilateral sciatica.    Up-to-date with eye exam and dentist.    Chronic BPH symptoms.  Currently on Cardura 6 mg daily.  No recent BP checks at home.    .     Diet and Physical Activity:  - Diet/Nutrition: well balanced diet.  - Exercise: walking.    Depression Screening:  - PHQ-2 Score: 0    General Health:  - Sleep: sleeps well.  - Vision: wears glasses, vision problems and most recent eye exam < 1 year ago.  - Dental: regular dental visits.     Health:    - Urinary symptoms: urinary frequency and urinary hesitancy.     Review of Systems   Constitutional: Negative.    HENT: Negative.     Respiratory: Negative.     Cardiovascular: Negative.    Gastrointestinal: Negative.    Genitourinary: Negative.    Musculoskeletal:  Positive for back pain, myalgias and neck pain. " "  Neurological:  Positive for dizziness, light-headedness and headaches.   Psychiatric/Behavioral:  Positive for sleep disturbance. The patient is nervous/anxious.      Medical History Reviewed by provider this encounter:  Tobacco  Allergies  Meds  Problems  Med Hx  Surg Hx  Fam Hx     .    Objective   /86   Pulse 70   Temp 97.8 °F (36.6 °C) (Temporal)   Resp 16   Ht 5' 10\" (1.778 m)   Wt 80.2 kg (176 lb 12.8 oz)   SpO2 98%   BMI 25.37 kg/m²   Vitals:    03/17/25 1600 03/17/25 1702   BP: 142/98 142/86   BP Location: Left arm    Patient Position: Sitting    Cuff Size: Standard    Pulse: 70    Resp: 16    Temp: 97.8 °F (36.6 °C)    TempSrc: Temporal    SpO2: 98%    Weight: 80.2 kg (176 lb 12.8 oz)    Height: 5' 10\" (1.778 m)        Physical Exam  Vitals and nursing note reviewed.   Constitutional:       General: He is not in acute distress.     Appearance: Normal appearance. He is well-developed. He is not ill-appearing.   HENT:      Head: Normocephalic and atraumatic.   Eyes:      General: No scleral icterus.     Conjunctiva/sclera: Conjunctivae normal.   Neck:      Vascular: No carotid bruit.   Cardiovascular:      Rate and Rhythm: Normal rate and regular rhythm.      Heart sounds: Normal heart sounds. No murmur heard.  Pulmonary:      Effort: Pulmonary effort is normal. No respiratory distress.      Breath sounds: Normal breath sounds. No wheezing or rales.   Abdominal:      General: Bowel sounds are normal. There is no distension or abdominal bruit.      Palpations: Abdomen is soft.      Tenderness: There is no abdominal tenderness.      Hernia: No hernia is present.   Musculoskeletal:         General: No swelling. Normal range of motion.      Cervical back: Neck supple.      Comments: Paraspinal cervical spasm   Neurological:      General: No focal deficit present.      Mental Status: He is alert and oriented to person, place, and time.      Cranial Nerves: No cranial nerve deficit. "   Psychiatric:         Attention and Perception: Attention normal.         Mood and Affect: Mood is anxious.         Behavior: Behavior normal.         Thought Content: Thought content normal.

## 2025-03-20 NOTE — ASSESSMENT & PLAN NOTE
Carpal tunnel syndrome suspected EMG in 2019.  Presents with complaints of finger pain radiating to the forearms.  Proceed with wrist ultrasound  Orders:  •  US MSK limited; Future

## 2025-03-20 NOTE — ASSESSMENT & PLAN NOTE
Persistent headaches and dizziness.  Migraine and tension components.  Dizziness has been present for almost a year with no improvement with vestibular PT.  Patient reports sensation of fogginess and poor concentration.  Proceed with brain MRI and labs.  Orders:  •  MRI brain w wo contrast; Future  •  Ambulatory Referral to Physical Therapy; Future  •  CBC and differential; Future  •  Comprehensive metabolic panel; Future  •  TSH, 3rd generation; Future  •  Vitamin D 25 hydroxy; Future

## 2025-03-28 ENCOUNTER — HOSPITAL ENCOUNTER (OUTPATIENT)
Dept: ULTRASOUND IMAGING | Facility: HOSPITAL | Age: 55
Discharge: HOME | End: 2025-03-28
Payer: COMMERCIAL

## 2025-03-28 DIAGNOSIS — G56.03 CARPAL TUNNEL SYNDROME, BILATERAL: ICD-10-CM

## 2025-03-28 PROCEDURE — 76882 US LMTD JT/FCL EVL NVASC XTR: CPT

## 2025-03-30 ENCOUNTER — RESULTS FOLLOW-UP (OUTPATIENT)
Dept: FAMILY MEDICINE CLINIC | Facility: CLINIC | Age: 55
End: 2025-03-30

## 2025-03-30 DIAGNOSIS — G56.03 CARPAL TUNNEL SYNDROME, BILATERAL: Primary | ICD-10-CM

## 2025-04-18 ENCOUNTER — APPOINTMENT (OUTPATIENT)
Dept: LAB | Facility: CLINIC | Age: 55
End: 2025-04-18
Payer: COMMERCIAL

## 2025-04-18 ENCOUNTER — HOSPITAL ENCOUNTER (OUTPATIENT)
Dept: MRI IMAGING | Facility: HOSPITAL | Age: 55
Discharge: HOME | End: 2025-04-18
Payer: COMMERCIAL

## 2025-04-18 DIAGNOSIS — Z12.5 PROSTATE CANCER SCREENING: ICD-10-CM

## 2025-04-18 DIAGNOSIS — R42 DIZZINESS: ICD-10-CM

## 2025-04-18 DIAGNOSIS — Z13.220 ENCOUNTER FOR SCREENING FOR LIPID DISORDER: ICD-10-CM

## 2025-04-18 DIAGNOSIS — G44.52 HEADACHE, NEW DAILY PERSISTENT (NDPH): ICD-10-CM

## 2025-04-18 DIAGNOSIS — M79.10 MYALGIA: ICD-10-CM

## 2025-04-18 DIAGNOSIS — E55.9 VITAMIN D DEFICIENCY: ICD-10-CM

## 2025-04-18 LAB
25(OH)D3 SERPL-MCNC: 40.5 NG/ML (ref 30–100)
ALBUMIN SERPL BCG-MCNC: 4.1 G/DL (ref 3.5–5)
ALP SERPL-CCNC: 53 U/L (ref 34–104)
ALT SERPL W P-5'-P-CCNC: 16 U/L (ref 7–52)
ANION GAP SERPL CALCULATED.3IONS-SCNC: 6 MMOL/L (ref 4–13)
AST SERPL W P-5'-P-CCNC: 16 U/L (ref 13–39)
BASOPHILS # BLD AUTO: 0.08 THOUSANDS/ÂΜL (ref 0–0.1)
BASOPHILS NFR BLD AUTO: 2 % (ref 0–1)
BILIRUB SERPL-MCNC: 0.8 MG/DL (ref 0.2–1)
BUN SERPL-MCNC: 19 MG/DL (ref 5–25)
CALCIUM SERPL-MCNC: 9.2 MG/DL (ref 8.4–10.2)
CHLORIDE SERPL-SCNC: 103 MMOL/L (ref 96–108)
CHOLEST SERPL-MCNC: 147 MG/DL (ref ?–200)
CK SERPL-CCNC: 72 U/L (ref 39–308)
CO2 SERPL-SCNC: 30 MMOL/L (ref 21–32)
CREAT SERPL-MCNC: 1.08 MG/DL (ref 0.6–1.3)
CRP SERPL QL: <1 MG/L
EOSINOPHIL # BLD AUTO: 0.15 THOUSAND/ÂΜL (ref 0–0.61)
EOSINOPHIL NFR BLD AUTO: 4 % (ref 0–6)
ERYTHROCYTE [DISTWIDTH] IN BLOOD BY AUTOMATED COUNT: 11.9 % (ref 11.6–15.1)
ERYTHROCYTE [SEDIMENTATION RATE] IN BLOOD: 12 MM/HOUR (ref 0–19)
GFR SERPL CREATININE-BSD FRML MDRD: 77 ML/MIN/1.73SQ M
GLUCOSE P FAST SERPL-MCNC: 99 MG/DL (ref 65–99)
HCT VFR BLD AUTO: 45.5 % (ref 36.5–49.3)
HDLC SERPL-MCNC: 46 MG/DL
HGB BLD-MCNC: 15.7 G/DL (ref 12–17)
IMM GRANULOCYTES # BLD AUTO: 0.01 THOUSAND/UL (ref 0–0.2)
IMM GRANULOCYTES NFR BLD AUTO: 0 % (ref 0–2)
LDLC SERPL CALC-MCNC: 89 MG/DL (ref 0–100)
LYMPHOCYTES # BLD AUTO: 1.18 THOUSANDS/ÂΜL (ref 0.6–4.47)
LYMPHOCYTES NFR BLD AUTO: 28 % (ref 14–44)
MCH RBC QN AUTO: 33.1 PG (ref 26.8–34.3)
MCHC RBC AUTO-ENTMCNC: 34.5 G/DL (ref 31.4–37.4)
MCV RBC AUTO: 96 FL (ref 82–98)
MONOCYTES # BLD AUTO: 0.42 THOUSAND/ÂΜL (ref 0.17–1.22)
MONOCYTES NFR BLD AUTO: 10 % (ref 4–12)
NEUTROPHILS # BLD AUTO: 2.35 THOUSANDS/ÂΜL (ref 1.85–7.62)
NEUTS SEG NFR BLD AUTO: 56 % (ref 43–75)
NRBC BLD AUTO-RTO: 0 /100 WBCS
PLATELET # BLD AUTO: 172 THOUSANDS/UL (ref 149–390)
PMV BLD AUTO: 8.7 FL (ref 8.9–12.7)
POTASSIUM SERPL-SCNC: 3.9 MMOL/L (ref 3.5–5.3)
PROT SERPL-MCNC: 7.1 G/DL (ref 6.4–8.4)
PSA SERPL-MCNC: 0.63 NG/ML (ref 0–4)
RBC # BLD AUTO: 4.74 MILLION/UL (ref 3.88–5.62)
SODIUM SERPL-SCNC: 139 MMOL/L (ref 135–147)
TRIGL SERPL-MCNC: 58 MG/DL (ref ?–150)
TSH SERPL DL<=0.05 MIU/L-ACNC: 1.83 UIU/ML (ref 0.45–4.5)
WBC # BLD AUTO: 4.19 THOUSAND/UL (ref 4.31–10.16)

## 2025-04-18 PROCEDURE — 80053 COMPREHEN METABOLIC PANEL: CPT

## 2025-04-18 PROCEDURE — 82550 ASSAY OF CK (CPK): CPT

## 2025-04-18 PROCEDURE — 82306 VITAMIN D 25 HYDROXY: CPT

## 2025-04-18 PROCEDURE — 80061 LIPID PANEL: CPT

## 2025-04-18 PROCEDURE — 85025 COMPLETE CBC W/AUTO DIFF WBC: CPT

## 2025-04-18 PROCEDURE — 86140 C-REACTIVE PROTEIN: CPT

## 2025-04-18 PROCEDURE — A9585 GADOBUTROL INJECTION: HCPCS | Performed by: FAMILY MEDICINE

## 2025-04-18 PROCEDURE — 85652 RBC SED RATE AUTOMATED: CPT

## 2025-04-18 PROCEDURE — 70553 MRI BRAIN STEM W/O & W/DYE: CPT

## 2025-04-18 PROCEDURE — G0103 PSA SCREENING: HCPCS

## 2025-04-18 PROCEDURE — 36415 COLL VENOUS BLD VENIPUNCTURE: CPT

## 2025-04-18 PROCEDURE — 84443 ASSAY THYROID STIM HORMONE: CPT

## 2025-04-18 PROCEDURE — 82085 ASSAY OF ALDOLASE: CPT

## 2025-04-18 RX ORDER — GADOBUTROL 604.72 MG/ML
8 INJECTION INTRAVENOUS
Status: COMPLETED | OUTPATIENT
Start: 2025-04-18 | End: 2025-04-18

## 2025-04-18 RX ADMIN — GADOBUTROL 8 ML: 604.72 INJECTION INTRAVENOUS at 12:14

## 2025-04-21 LAB — ALDOLASE SERPL-CCNC: 2.9 U/L (ref 3.3–10.3)

## 2025-04-22 DIAGNOSIS — R42 DIZZINESS: ICD-10-CM

## 2025-04-22 DIAGNOSIS — G44.52 HEADACHE, NEW DAILY PERSISTENT (NDPH): Primary | ICD-10-CM

## 2025-04-22 DIAGNOSIS — G93.0 ARACHNOID CYST: ICD-10-CM

## 2025-06-09 DIAGNOSIS — N40.1 BENIGN PROSTATIC HYPERPLASIA WITH URINARY HESITANCY: ICD-10-CM

## 2025-06-09 DIAGNOSIS — R39.11 BENIGN PROSTATIC HYPERPLASIA WITH URINARY HESITANCY: ICD-10-CM

## 2025-06-09 RX ORDER — DOXAZOSIN 2 MG/1
2 TABLET ORAL EVERY MORNING
Qty: 90 TABLET | Refills: 3 | Status: SHIPPED | OUTPATIENT
Start: 2025-06-09

## 2025-06-18 ENCOUNTER — OFFICE VISIT (OUTPATIENT)
Dept: FAMILY MEDICINE CLINIC | Facility: CLINIC | Age: 55
End: 2025-06-18
Payer: COMMERCIAL

## 2025-06-18 DIAGNOSIS — H93.13 TINNITUS OF BOTH EARS: ICD-10-CM

## 2025-06-18 DIAGNOSIS — F41.8 MIXED ANXIETY DEPRESSIVE DISORDER: ICD-10-CM

## 2025-06-18 DIAGNOSIS — R42 DIZZINESS: Primary | ICD-10-CM

## 2025-06-18 PROCEDURE — 99213 OFFICE O/P EST LOW 20 MIN: CPT | Performed by: FAMILY MEDICINE

## 2025-06-18 NOTE — ASSESSMENT & PLAN NOTE
Intermittent symptoms of dizziness and chronic tinnitus.  MRI brain April 2025-no concerning finding, stable retrocerebellar arachnoid cyst, unchanged since 2006  Referral to ENT.  Orders:  •  Ambulatory Referral to Otolaryngology; Future

## 2025-06-18 NOTE — PATIENT INSTRUCTIONS
Please schedule ENT/otolaryngology consult  Please try Zoloft, start half a tab once a day for 6 days then increase dose to 1 tab daily.  Medication takes 4 to 6 weeks to work.  Please keep me posted if any concern  If it anytime you would like to pursue neck, lower back or vestibular physical therapy-please let me know

## 2025-06-18 NOTE — ASSESSMENT & PLAN NOTE
Ongoing symptoms of stress, lack of motivation and drive.  Start Zoloft  Orders:  •  sertraline (Zoloft) 50 mg tablet; Take half a tablet once a day for 6 days, then take 1 tab daily

## 2025-06-18 NOTE — PROGRESS NOTES
Name: Bhavik Corado      : 1970      MRN: [id number]  Encounter Provider: Karen Yanez MD  Encounter Date: 2025   Encounter department: [facility]    Assessment & Plan  Dizziness  Intermittent symptoms of dizziness and chronic tinnitus.  MRI brain 2025-no concerning finding, stable retrocerebellar arachnoid cyst, unchanged since   Referral to ENT.  Orders:  •  Ambulatory Referral to Otolaryngology; Future    Tinnitus of both ears    Orders:  •  Ambulatory Referral to Otolaryngology; Future    Mixed anxiety depressive disorder  Ongoing symptoms of stress, lack of motivation and drive.  Start Zoloft  Orders:  •  sertraline (Zoloft) 50 mg tablet; Take half a tablet once a day for 6 days, then take 1 tab daily       Patient Instructions   Please schedule ENT/otolaryngology consult  Please try Zoloft, start half a tab once a day for 6 days then increase dose to 1 tab daily.  Medication takes 4 to 6 weeks to work.  Please keep me posted if any concern  If it anytime you would like to pursue neck, lower back or vestibular physical therapy-please let me know    Return in about 3 months (around 2025) for Check up.    History of Present Illness     Follow-up   Chronic intermittent dizziness and bilateral tinnitus BL - also  reports clogged/closing sensation of both ears  Results of recent MRI reviewed.  No acute findings.  Patient was evaluated by neurology, Dr. Valentine in the past.  Current MRI is reported stable compared to previous study in     MRI brain:  No significant interval change since prior examination. No acute intracranial hemorrhage or evidence of recent infarction.  Stable retrocerebellar arachnoid cyst.     Reports ongoing stress, primarily work-related.  Lack of ambition and drive.  Feels generally disinterested    Home BPs reviewed, fairly well-controlled.         Review of Systems   Constitutional: Negative.    HENT:  Positive for tinnitus.   "  Respiratory: Negative.     Cardiovascular: Negative.    Gastrointestinal: Negative.    Neurological:  Positive for dizziness. Negative for syncope and headaches.   Psychiatric/Behavioral:  Positive for dysphoric mood.      Past Medical History[1]  Past Surgical History[2]  Family History[3]  Social History[4]  Medications[5]  No Known Allergies    There is no immunization history on file for this patient.  Objective   /84 (BP Location: Left arm, Patient Position: Sitting, Cuff Size: Standard)   Pulse 86   Temp 98 °F (36.7 °C) (Temporal)   Resp 16   Ht 5' 10\" (1.778 m)   Wt 80.9 kg (178 lb 6.4 oz)   SpO2 96%   BMI 25.60 kg/m²     Physical Exam  Vitals and nursing note reviewed.   Constitutional:       General: He is not in acute distress.     Appearance: Normal appearance. He is not ill-appearing.     Cardiovascular:      Rate and Rhythm: Normal rate and regular rhythm.      Heart sounds: Normal heart sounds. No murmur heard.     Comments: 142/84    Musculoskeletal:      Cervical back: Neck supple. No rigidity.     Neurological:      General: No focal deficit present.      Mental Status: He is alert and oriented to person, place, and time.     Psychiatric:         Mood and Affect: Mood normal.         Behavior: Behavior normal.         Thought Content: Thought content normal.                [1]  Past Medical History:  Diagnosis Date   • Allergic April   • Arthritis April   • Hypertension    • Shingles December   • Swelling of finger joint of right hand 06/26/2020    Right 3rd digit   • Thoracic outlet syndrome 10/07/2019   • Upper back pain on left side 10/17/2019   [2]  Past Surgical History:  Procedure Laterality Date   • COLONOSCOPY     • HERNIA REPAIR     • TONSILLECTOMY     [3]  Family History  Problem Relation Name Age of Onset   • Cancer Mother Mom         Bladder   • Hypertension Mother Mom    • Skin cancer Father     • Arthritis Father     • Diabetes Brother     • Rheum arthritis Brother     • " Hypertension Brother Ren    • Diabetes Brother Ren    • Arthritis Brother Ren    • Arthritis Maternal Grandmother Alida    • Arthritis Paternal Uncle Jorge    [4]  Social History  Tobacco Use   • Smoking status: Never   • Smokeless tobacco: Never   Vaping Use   • Vaping status: Never Used   Substance and Sexual Activity   • Alcohol use: Yes     Comment: Social   • Drug use: No   • Sexual activity: Yes     Partners: Female     Birth control/protection: None   [5]  Current Outpatient Medications on File Prior to Visit   Medication Sig   • Black Pepper-Turmeric (TURMERIC COMPLEX/BLACK PEPPER PO)    • doxazosin (CARDURA) 2 mg tablet Take 1 tablet (2 mg total) by mouth every morning   • doxazosin (CARDURA) 4 mg tablet Take 1 tablet (4 mg total) by mouth daily at bedtime   • Magnesium 100 MG CAPS Take by mouth   • Multiple Vitamin (MULTIVITAMINS PO) Take 1 capsule by mouth in the morning.

## 2025-06-22 VITALS
OXYGEN SATURATION: 96 % | WEIGHT: 178.4 LBS | TEMPERATURE: 98 F | HEIGHT: 70 IN | SYSTOLIC BLOOD PRESSURE: 142 MMHG | HEART RATE: 86 BPM | DIASTOLIC BLOOD PRESSURE: 84 MMHG | BODY MASS INDEX: 25.54 KG/M2 | RESPIRATION RATE: 16 BRPM

## 2025-06-22 PROBLEM — F41.8 MIXED ANXIETY DEPRESSIVE DISORDER: Status: ACTIVE | Noted: 2025-03-19

## 2025-06-22 PROBLEM — G44.52 HEADACHE, NEW DAILY PERSISTENT (NDPH): Status: RESOLVED | Noted: 2025-03-19 | Resolved: 2025-06-22
